# Patient Record
Sex: FEMALE | Race: WHITE | NOT HISPANIC OR LATINO | Employment: OTHER | URBAN - NONMETROPOLITAN AREA
[De-identification: names, ages, dates, MRNs, and addresses within clinical notes are randomized per-mention and may not be internally consistent; named-entity substitution may affect disease eponyms.]

---

## 2018-04-09 ENCOUNTER — OFFICE VISIT (OUTPATIENT)
Dept: OBSTETRICS AND GYNECOLOGY | Facility: CLINIC | Age: 68
End: 2018-04-09

## 2018-04-09 VITALS
HEIGHT: 55 IN | BODY MASS INDEX: 33.56 KG/M2 | SYSTOLIC BLOOD PRESSURE: 120 MMHG | WEIGHT: 145 LBS | DIASTOLIC BLOOD PRESSURE: 60 MMHG

## 2018-04-09 DIAGNOSIS — Z01.419 ENCOUNTER FOR GYNECOLOGICAL EXAMINATION WITHOUT ABNORMAL FINDING: Primary | ICD-10-CM

## 2018-04-09 DIAGNOSIS — Z12.31 ENCOUNTER FOR SCREENING MAMMOGRAM FOR BREAST CANCER: ICD-10-CM

## 2018-04-09 DIAGNOSIS — M85.9 DISORDER OF BONE DENSITY AND STRUCTURE, UNSPECIFIED: ICD-10-CM

## 2018-04-09 PROCEDURE — 99397 PER PM REEVAL EST PAT 65+ YR: CPT | Performed by: OBSTETRICS & GYNECOLOGY

## 2018-04-09 RX ORDER — BUTALBITAL, ACETAMINOPHEN, CAFFEINE AND CODEINE PHOSPHATE 50; 325; 40; 30 MG/1; MG/1; MG/1; MG/1
1 CAPSULE ORAL EVERY 4 HOURS PRN
COMMUNITY
End: 2018-08-08

## 2018-04-09 RX ORDER — OMEPRAZOLE 20 MG/1
20 CAPSULE, DELAYED RELEASE ORAL DAILY
COMMUNITY
End: 2022-04-27

## 2018-04-09 RX ORDER — GABAPENTIN 800 MG/1
800 TABLET ORAL 3 TIMES DAILY
COMMUNITY
End: 2018-08-08

## 2018-04-09 RX ORDER — MELOXICAM 15 MG/1
15 TABLET ORAL DAILY PRN
COMMUNITY
End: 2021-11-11

## 2018-04-09 RX ORDER — ATORVASTATIN CALCIUM 80 MG/1
80 TABLET, FILM COATED ORAL DAILY
COMMUNITY

## 2018-04-09 RX ORDER — TIZANIDINE 4 MG/1
4 TABLET ORAL NIGHTLY PRN
COMMUNITY

## 2018-04-09 NOTE — PROGRESS NOTES
Subjective  Chief Complaint   Patient presents with   • Gynecologic Exam     Patient is 67 y.o.  here for annual examination.  Pt see's Dr. Daniel for primary care.  Pt has been dx with fibromyalgia.  Pt with history of low bone density; has not had scan for several years.  Pt also with history of problems with IBS.  Pt reports due for colonoscopy; desires to have done in [place].  Pt did have MMG done last year in August; [facility] and reports normal.    History  Past Medical History:   Diagnosis Date   • Fibromyalgia    • GERD (gastroesophageal reflux disease)    • H/O bone density study    • H/O mammogram 2016   • Hyperlipemia    • IBS (irritable bowel syndrome)    • IBS (irritable bowel syndrome)      No current outpatient prescriptions on file prior to visit.     No current facility-administered medications on file prior to visit.      Allergies   Allergen Reactions   • Prednisone Other (See Comments)     HYPOGLYCEMIC EPISODE, GOES BLIND.      Past Surgical History:   Procedure Laterality Date   • BLADDER REPAIR     • COLONOSCOPY     • ELBOW ARTHROSCOPY     • GALLBLADDER SURGERY     • HYSTERECTOMY     • KNEE SURGERY     • TUBAL ABDOMINAL LIGATION       Family History   Problem Relation Age of Onset   • Ovarian cancer Mother    • Diabetes Mother    • Heart disease Mother    • Osteoporosis Sister    • Diabetes Brother    • Heart disease Brother    • Stroke Brother    • Colon polyps Brother      Social History     Social History   • Marital status:      Social History Main Topics   • Smoking status: Never Smoker   • Smokeless tobacco: Never Used   • Alcohol use No   • Drug use: No   • Sexual activity: Defer     Other Topics Concern   • Not on file     Review of Systems  All systems were reviewed and negative except for:  Constitution:  positive for trouble sleeping  ENT:  positive for nasal congestion  Musculoskeletal: positive for  joint pain and muscle  "weakness  Behavioral/Psych: positive for  unstable mood     Objective  Vitals:    04/09/18 1500   BP: 120/60   Weight: 65.8 kg (145 lb)   Height: 53 cm (20.87\")     Physical Exam:  General Appearance: alert, appears stated age and cooperative  Head: normocephalic, without obvious abnormality and atraumatic  Eyes: lids and lashes normal, conjunctivae and sclerae normal, no icterus, no pallor, corneas clear and PERRLA  Ears: ears appear intact with no abnormalities noted  Nose: nares normal, septum midline, mucosa normal and no drainage  Neck: suppple, trachea midline and no thyromegaly  Lungs: clear to auscultation, respirations regular, respirations even and respirations unlabored  Heart: regular rhythm and normal rate, normal S1, S2, no murmur, gallop, or rubs and no click  Breasts: Examined in supine position  Symmetric without masses or skin dimpling  Nipples normal without inversion, lesions or discharge  There are no palpable axillary nodes  Abdomen: normal bowel sounds, no masses, no hepatomegaly, no splenomegaly, soft non-tender, no guarding and no rebound tenderness  Pelvic: Clinical staff was present for exam  External genitalia:  normal appearance of the external genitalia including Bartholin's and [place]'s glands.  :  urethral meatus normal;  Vaginal:  atrophic mucosal changes are present;  Cervix:  absent.  Uterus:  absent.  Adnexa:  normal bimanual exam of the adnexa.  Extremities: moves extremities well, no edema, no cyanosis and no redness  Skin: no bleeding, bruising or rash and no lesions noted  Lymph Nodes: no palpable adenopathy  Neuro: CN II-X grossly intact; sensation intact  Psych: normal mood and affect, oriented to person, time and place, thought content organized and appropriate judgment    Lab Review   No data reviewed    Imaging   Mammogram report    Assessment/Plan  Problem List Items Addressed This Visit     None      Visit Diagnoses     Encounter for gynecological examination without " abnormal finding    -  Primary  Pap was done today.  If she does not receive the results of the Pap within 2 weeks  time, she was instructed to call to find out the results.  I explained to Klaudia that the recommendations for Pap smear interval in a low risk patient has lengthened to 3 years time if cytology alone normal or  5 years time if both cytology and HPV testing were normal.  I encouraged her to be seen yearly for a full physical exam including breast and pelvic exam even during the off years when PAP's will not be performed.    Bone density testing was recommended.  It is recommended that all women should begin DEXA screening at age 65 years and screening can be used selectively for women younger who have risk factors.   I reviewed with Klaudia that it was always most advisable for all bone density tests for each patient to be done on the same machine over time.  The purpose of this is to improve the accuracy of the interpretation of serial studies.    Relevant Orders    Pap IG, Rfx HPV ASCU    Ambulatory Referral to General Surgery (Completed)    Encounter for screening mammogram for breast cancer      It is recommended per ACOG, for women at average risk to start annual mammogram screening at the age of 40 until the age of 75 and an individualized decision be made for women after age 75.  She was encouraged to continue getting yearly mammograms.  She should report any palpable breast lump(s) or skin changes regardless of mammographic findings.  I explained to Klaudia that notification regarding her mammogram results will come from the center performing the study.  Our office will not be routinely calling with mammogram results.  It is her responsibility to make sure that the results from the mammogram are communicated to her by the breast center.  If she has any questions about the results, she is welcome to call our office anytime.  MMG due in August.    Relevant Orders    Mammo Screening Digital Tomosynthesis  Bilateral With CAD    Disorder of bone density and structure, unspecified       See plan above    Relevant Orders    DEXA Bone Density Axial        Follow up as discussed   This note was electronically signed.  Teressa Gomes M.D.

## 2018-04-13 DIAGNOSIS — Z01.419 ENCOUNTER FOR GYNECOLOGICAL EXAMINATION WITHOUT ABNORMAL FINDING: ICD-10-CM

## 2018-04-17 ENCOUNTER — APPOINTMENT (OUTPATIENT)
Dept: BONE DENSITY | Facility: HOSPITAL | Age: 68
End: 2018-04-17
Attending: OBSTETRICS & GYNECOLOGY

## 2018-04-17 DIAGNOSIS — M85.9 DISORDER OF BONE DENSITY AND STRUCTURE, UNSPECIFIED: ICD-10-CM

## 2018-04-17 PROCEDURE — 77080 DXA BONE DENSITY AXIAL: CPT

## 2018-07-16 ENCOUNTER — OFFICE VISIT (OUTPATIENT)
Dept: OBSTETRICS AND GYNECOLOGY | Facility: CLINIC | Age: 68
End: 2018-07-16

## 2018-07-16 VITALS
SYSTOLIC BLOOD PRESSURE: 110 MMHG | BODY MASS INDEX: 24.1 KG/M2 | DIASTOLIC BLOOD PRESSURE: 70 MMHG | HEIGHT: 63 IN | WEIGHT: 136 LBS

## 2018-07-16 DIAGNOSIS — N81.11 CYSTOCELE, MIDLINE: ICD-10-CM

## 2018-07-16 DIAGNOSIS — N81.6 RECTOCELE: Primary | ICD-10-CM

## 2018-07-16 PROCEDURE — 99213 OFFICE O/P EST LOW 20 MIN: CPT | Performed by: PHYSICIAN ASSISTANT

## 2018-07-16 NOTE — PROGRESS NOTES
Subjective   Chief Complaint   Patient presents with   • Vaginal Prolapse     woke up this morning with buldging from vagina; Bladder repair in        Klaudia Heredia is a 67 y.o. year old  presenting to be seen for complaint of vaginal buldge.  She noted buldge this morning for the first time but states has felt different in vaginal area for a few weeks.  She has had previous hysterectomy and BSO and bladder repair per Dr. Vasquez several years ago in .     She has urinary urgency but no dysuria or recent UTI. She feels like still has stool in rectum after having bowel movement.   Patient very active with tennis and pickle ball       Past Medical History:   Diagnosis Date   • Fibromyalgia    • GERD (gastroesophageal reflux disease)    • H/O bone density study    • H/O mammogram 2016   • Hyperlipemia    • IBS (irritable bowel syndrome)    • IBS (irritable bowel syndrome)         Current Outpatient Prescriptions:   •  meloxicam (MOBIC) 15 MG tablet, Take 15 mg by mouth Daily., Disp: , Rfl:   •  omeprazole (priLOSEC) 20 MG capsule, Take 20 mg by mouth Daily., Disp: , Rfl:   •  atorvastatin (LIPITOR) 80 MG tablet, Take 80 mg by mouth Daily., Disp: , Rfl:   •  butalbital-acetaminophen-caffeine-codeine (FIORICET/CODEINE) -23-30 MG per capsule, Take 1 capsule by mouth Every 4 (Four) Hours As Needed for Headache., Disp: , Rfl:   •  gabapentin (NEURONTIN) 800 MG tablet, Take 800 mg by mouth 3 (Three) Times a Day., Disp: , Rfl:   •  tiZANidine (ZANAFLEX) 4 MG tablet, Take 4 mg by mouth At Night As Needed for Muscle Spasms., Disp: , Rfl:    Allergies   Allergen Reactions   • Prednisone Other (See Comments)     HYPOGLYCEMIC EPISODE, GOES BLIND.       Past Surgical History:   Procedure Laterality Date   • BILATERAL OOPHORECTOMY     • BLADDER REPAIR     • COLONOSCOPY     • ELBOW ARTHROSCOPY     • GALLBLADDER SURGERY     • HYSTERECTOMY     • KNEE SURGERY     • TUBAL  "ABDOMINAL LIGATION        Social History     Social History   • Marital status:      Spouse name: N/A   • Number of children: N/A   • Years of education: N/A     Occupational History   • Not on file.     Social History Main Topics   • Smoking status: Never Smoker   • Smokeless tobacco: Never Used   • Alcohol use No   • Drug use: No   • Sexual activity: Yes     Partners: Male     Birth control/ protection: Surgical     Other Topics Concern   • Not on file     Social History Narrative   • No narrative on file      Family History   Problem Relation Age of Onset   • Ovarian cancer Mother    • Diabetes Mother    • Heart disease Mother    • Osteoporosis Sister    • Diabetes Brother    • Heart disease Brother    • Stroke Brother    • Colon polyps Brother        Review of Systems   Constitutional: Negative.    Gastrointestinal: Negative.    Genitourinary: Positive for urgency. Negative for difficulty urinating, dyspareunia, dysuria, pelvic pain, vaginal bleeding and vaginal discharge.           Objective   /70   Ht 160 cm (63\")   Wt 61.7 kg (136 lb)   Breastfeeding? No   BMI 24.09 kg/m²     Physical Exam   Constitutional: She appears well-developed and well-nourished. She is cooperative.   Abdominal: Soft. Normal appearance. There is no tenderness.   Genitourinary: There is no tenderness or lesion on the right labia. There is no tenderness or lesion on the left labia.   Genitourinary Comments: 2-3 plus rectocele with cough and 1 plus cystocele.  Vaginal cuff 1 plus prolapsed   Neurological: She is alert.   Skin: Skin is warm and dry.   Psychiatric: She has a normal mood and affect. Her behavior is normal.            Assessment and Plan  Klaudia was seen today for vaginal prolapse.    Diagnoses and all orders for this visit:    Rectocele    Cystocele, midline      Patient Instructions   Patient counseled regarding conservative options of kegels and limiting activities. However she states she does not want " buldge to remain in vaginal area and is desiring to discuss surgical repair so will have her return to be examined by Dr. Gomes and discuss rectocele repair, possible small anterior repair, possible enterocele repair.               This note was electronically signed.    Wandy Hunt PA-C   July 16, 2018

## 2018-07-16 NOTE — PATIENT INSTRUCTIONS
Patient counseled regarding conservative options of kegels and limiting activities. However she states she does not want buldge to remain in vaginal area and is desiring to discuss surgical repair so will have her return to be examined by Dr. Gomes and discuss rectocele repair, possible small anterior repair, possible enterocele repair.

## 2018-08-08 ENCOUNTER — APPOINTMENT (OUTPATIENT)
Dept: PREADMISSION TESTING | Facility: HOSPITAL | Age: 68
End: 2018-08-08

## 2018-08-08 ENCOUNTER — OFFICE VISIT (OUTPATIENT)
Dept: OBSTETRICS AND GYNECOLOGY | Facility: CLINIC | Age: 68
End: 2018-08-08

## 2018-08-08 ENCOUNTER — PREP FOR SURGERY (OUTPATIENT)
Dept: OTHER | Facility: HOSPITAL | Age: 68
End: 2018-08-08

## 2018-08-08 VITALS
WEIGHT: 137 LBS | SYSTOLIC BLOOD PRESSURE: 112 MMHG | BODY MASS INDEX: 24.27 KG/M2 | HEIGHT: 63 IN | DIASTOLIC BLOOD PRESSURE: 67 MMHG

## 2018-08-08 VITALS — WEIGHT: 135 LBS | BODY MASS INDEX: 23.92 KG/M2 | HEIGHT: 63 IN

## 2018-08-08 DIAGNOSIS — N81.11 CYSTOCELE, MIDLINE: ICD-10-CM

## 2018-08-08 DIAGNOSIS — N81.6 RECTOCELE: ICD-10-CM

## 2018-08-08 DIAGNOSIS — N81.11 MIDLINE CYSTOCELE: Primary | ICD-10-CM

## 2018-08-08 DIAGNOSIS — N95.2 POSTMENOPAUSAL ATROPHIC VAGINITIS: Primary | ICD-10-CM

## 2018-08-08 DIAGNOSIS — N81.11 MIDLINE CYSTOCELE: ICD-10-CM

## 2018-08-08 LAB
ABO GROUP BLD: NORMAL
ANION GAP SERPL CALCULATED.3IONS-SCNC: 13.2 MMOL/L (ref 10–20)
BILIRUB UR QL STRIP: NEGATIVE
BUN BLD-MCNC: 16 MG/DL (ref 7–20)
BUN/CREAT SERPL: 22.9 (ref 7.1–23.5)
CALCIUM SPEC-SCNC: 10.2 MG/DL (ref 8.4–10.2)
CHLORIDE SERPL-SCNC: 102 MMOL/L (ref 98–107)
CLARITY UR: CLEAR
CO2 SERPL-SCNC: 29 MMOL/L (ref 26–30)
COLOR UR: YELLOW
CREAT BLD-MCNC: 0.7 MG/DL (ref 0.6–1.3)
DEPRECATED RDW RBC AUTO: 39.9 FL (ref 37–54)
ERYTHROCYTE [DISTWIDTH] IN BLOOD BY AUTOMATED COUNT: 11.9 % (ref 11.5–14.5)
GFR SERPL CREATININE-BSD FRML MDRD: 84 ML/MIN/1.73
GLUCOSE BLD-MCNC: 96 MG/DL (ref 74–98)
GLUCOSE UR STRIP-MCNC: NEGATIVE MG/DL
HCT VFR BLD AUTO: 43.1 % (ref 37–47)
HGB BLD-MCNC: 14.8 G/DL (ref 12–16)
HGB UR QL STRIP.AUTO: NEGATIVE
KETONES UR QL STRIP: NEGATIVE
LEUKOCYTE ESTERASE UR QL STRIP.AUTO: NEGATIVE
MCH RBC QN AUTO: 31.6 PG (ref 27–31)
MCHC RBC AUTO-ENTMCNC: 34.3 G/DL (ref 30–37)
MCV RBC AUTO: 91.9 FL (ref 81–99)
NITRITE UR QL STRIP: NEGATIVE
PH UR STRIP.AUTO: 6.5 [PH] (ref 5–8)
PLATELET # BLD AUTO: 304 10*3/MM3 (ref 130–400)
PMV BLD AUTO: 10.6 FL (ref 6–12)
POTASSIUM BLD-SCNC: 4.2 MMOL/L (ref 3.5–5.1)
PROT UR QL STRIP: NEGATIVE
RBC # BLD AUTO: 4.69 10*6/MM3 (ref 4.2–5.4)
RH BLD: POSITIVE
SODIUM BLD-SCNC: 140 MMOL/L (ref 137–145)
SP GR UR STRIP: 1.02 (ref 1–1.03)
UROBILINOGEN UR QL STRIP: NORMAL
WBC NRBC COR # BLD: 10.65 10*3/MM3 (ref 4.8–10.8)

## 2018-08-08 PROCEDURE — 81003 URINALYSIS AUTO W/O SCOPE: CPT | Performed by: OBSTETRICS & GYNECOLOGY

## 2018-08-08 PROCEDURE — 86900 BLOOD TYPING SEROLOGIC ABO: CPT | Performed by: OBSTETRICS & GYNECOLOGY

## 2018-08-08 PROCEDURE — 86901 BLOOD TYPING SEROLOGIC RH(D): CPT | Performed by: OBSTETRICS & GYNECOLOGY

## 2018-08-08 PROCEDURE — 80048 BASIC METABOLIC PNL TOTAL CA: CPT | Performed by: OBSTETRICS & GYNECOLOGY

## 2018-08-08 PROCEDURE — 36415 COLL VENOUS BLD VENIPUNCTURE: CPT

## 2018-08-08 PROCEDURE — 99214 OFFICE O/P EST MOD 30 MIN: CPT | Performed by: OBSTETRICS & GYNECOLOGY

## 2018-08-08 PROCEDURE — 85027 COMPLETE CBC AUTOMATED: CPT | Performed by: OBSTETRICS & GYNECOLOGY

## 2018-08-08 RX ORDER — ESTRADIOL 0.1 MG/G
CREAM VAGINAL
Qty: 1 EACH | Refills: 11 | Status: SHIPPED | OUTPATIENT
Start: 2018-08-08 | End: 2018-08-30 | Stop reason: HOSPADM

## 2018-08-08 RX ORDER — PHENAZOPYRIDINE HYDROCHLORIDE 100 MG/1
200 TABLET, FILM COATED ORAL ONCE
Status: CANCELLED | OUTPATIENT
Start: 2018-08-08 | End: 2018-08-08

## 2018-08-08 RX ORDER — MELATONIN
1000 DAILY
COMMUNITY

## 2018-08-08 RX ORDER — LANOLIN ALCOHOL/MO/W.PET/CERES
1000 CREAM (GRAM) TOPICAL DAILY
COMMUNITY

## 2018-08-08 RX ORDER — SODIUM CHLORIDE 0.9 % (FLUSH) 0.9 %
1-10 SYRINGE (ML) INJECTION AS NEEDED
Status: CANCELLED | OUTPATIENT
Start: 2018-08-08

## 2018-08-08 NOTE — PROGRESS NOTES
Subjective  Chief Complaint   Patient presents with   • Follow-up     DISCUSS CYSTOCELE, RECOCELE REPAIR.      Patient is 67 y.o.  here for evaluation and discussion of treatment for rectocele.  Pt had annual examination done earlier this year.  Pt reports no problems at that time.  Pt did have colonoscopy done earlier this year.  Pt with complaints of an acute onset of vaginal pressure and bulge per vagina.  Pt reports stools are soft and patient does not strain with bms.  Pt does report however she does not feel like she is getting all the stool out now.  Pt denies any problems with urination.  Pt has not changed any activities.  Pt is very active; plays tennis and various sports.  Pt does not want to try pessary and desires surgical intervention.  Pt is not on any topical estrogen.  Pt has not been sick recently or had any changes otherwise.    History  Past Medical History:   Diagnosis Date   • Arthritis     REPORTS BOTHERSOME IN HER NECK   • Body piercing     EARS   • Cancer (CMS/HCC)     SKIN (REMOVED FROM NOSE)   • Elevated cholesterol    • Fibromyalgia    • GERD (gastroesophageal reflux disease)    • H/O bone density study    • H/O exercise stress test     REPORTS APPROXIMATELY  AND THAT ALL WAS WNL'S   • H/O mammogram 2016   • Headache    • Hearing loss     REPORTS MINIMAL AND ONLY WITH CERTAIN FREQUENCIES. NO HEARING AIDS   • History of bronchitis    • History of TMJ syndrome    • Hyperlipemia    • IBS (irritable bowel syndrome)    • IBS (irritable bowel syndrome)    • Rectocele    • Seasonal allergies    • TB (tuberculosis)     REPORTS +TB SKIN TEST IN THE S.  REPORTS SHE HAD TREATMENT FOR 1 YEAR BUT NO ACTIVE DISEASE.    • Wears glasses     NON RX FOR READING     Current Outpatient Prescriptions on File Prior to Visit   Medication Sig Dispense Refill   • atorvastatin (LIPITOR) 80 MG tablet Take 80 mg by mouth Daily.     • meloxicam (MOBIC) 15 MG tablet Take 15 mg by mouth Daily As  Needed for Mild Pain .     • omeprazole (priLOSEC) 20 MG capsule Take 20 mg by mouth Daily.     • tiZANidine (ZANAFLEX) 4 MG tablet Take 4 mg by mouth At Night As Needed for Muscle Spasms.     • [DISCONTINUED] butalbital-acetaminophen-caffeine-codeine (FIORICET/CODEINE) -99-30 MG per capsule Take 1 capsule by mouth Every 4 (Four) Hours As Needed for Headache.     • [DISCONTINUED] gabapentin (NEURONTIN) 800 MG tablet Take 800 mg by mouth 3 (Three) Times a Day.       No current facility-administered medications on file prior to visit.      Allergies   Allergen Reactions   • Prednisone Other (See Comments)     REPORTS CAUSED VISUAL DISTURBANCE (ACUTE BLINDNESS) AND HYPOGLYCEMIA.  REPORTS SHE IS ABLE TO TAKE STEROID INJECTIONS, BUT THAT SHE IS NOT ABLE TO TOLERATE ORAL DOSES FOR PROLONGED PERIODS OF TIME.     Past Surgical History:   Procedure Laterality Date   • BILATERAL OOPHORECTOMY     • BLADDER REPAIR  2001   • COLONOSCOPY  2011   • CYST REMOVAL      POSTERIOR UPPER LEFT THIGH AND LEFT SIDE OF NECK   • ELBOW ARTHROSCOPY Right 1996   • ENDOSCOPY     • EYE SURGERY Bilateral     CATARACT EXTRACTIONS   • GALLBLADDER SURGERY  2006   • HYSTERECTOMY  2002   • KNEE SURGERY      REPORTS 3 SURGERIES LEFT KNEE, 1 SURGERY RIGHT KNEE   • SKIN BIOPSY      REPORTS SKIN CANCER REMOVED FROM NOSE   • TUBAL ABDOMINAL LIGATION     • WISDOM TOOTH EXTRACTION      EXTRACTIONS ON THE LEFT SIDE (TOP AND BOTTOM)     Family History   Problem Relation Age of Onset   • Ovarian cancer Mother    • Diabetes Mother    • Heart disease Mother    • Osteoporosis Sister    • Diabetes Brother    • Heart disease Brother    • Stroke Brother    • Colon polyps Brother      Social History     Social History   • Marital status:      Social History Main Topics   • Smoking status: Never Smoker   • Smokeless tobacco: Never Used   • Alcohol use No   • Drug use: No   • Sexual activity: Yes     Partners: Male     Birth control/ protection: Surgical  "    Other Topics Concern   • Not on file     Review of Systems  All systems were reviewed and negative except for:  Gastrointestinal: postitive for  constipation  Genitourinary: postivie for  difficulty/inability to void     Objective  Vitals:    08/08/18 1457   BP: 112/67   Weight: 62.1 kg (137 lb)   Height: 160 cm (63\")     Physical Exam:  General Appearance: alert, appears stated age and cooperative  Head: normocephalic, without obvious abnormality and atraumatic  Eyes: lids and lashes normal, conjunctivae and sclerae normal, no icterus, no pallor, corneas clear and PERRLA  Ears: ears appear intact with no abnormalities noted  Nose: nares normal, septum midline, mucosa normal and no drainage  Neck: suppple, trachea midline and no thyromegaly  Lungs: clear to auscultation, respirations regular, respirations even and respirations unlabored  Heart: regular rhythm and normal rate, normal S1, S2, no murmur, gallop, or rubs and no click  Breasts: Not performed.  Abdomen: normal bowel sounds, no masses, no hepatomegaly, no splenomegaly, soft non-tender, no guarding and no rebound tenderness  Pelvic: Clinical staff was present for exam  External genitalia:  normal appearance of the external genitalia including Bartholin's and [place]'s glands.  :  urethral meatus normal;  Vaginal:  atrophic mucosal changes are present;  Cervix:  absent.  Uterus:  absent.  Adnexa:  normal bimanual exam of the adnexa.  Cystocele GRADE 1  Rectocele GRADE 3  Extremities: moves extremities well, no edema, no cyanosis and no redness  Skin: no bleeding, bruising or rash and no lesions noted  Lymph Nodes: no palpable adenopathy  Neuro: CN II-X grossly intact; sensation intact  Psych: normal mood and affect, oriented to person, time and place, thought content organized and appropriate judgment  Lab Review   No data reviewed    Imaging   No data reviewed    Assessment/Plan  Problem List Items Addressed This Visit        Digestive    Rectocele  See " plan below      Other Visit Diagnoses     Postmenopausal atrophic vaginitis    -  Primary  Discussed various options for relief of atrophic vaginal symptoms related to menopause. Discussed local therapy for treatment of vaginal symptoms only.  Discussed the different formulation options including cream, ring, and tablets.  Discussed the low risk of systemic absorption in postmenopausal women with atrophy using 25 mcgs of estradiol on a daily basis.  Recommend low dose use 2-3x/wk for maintenance of treatment.  Other treatment options were discussed including the use of water-based and silicone-based vaginal lubricants and moisturizers.  Also discussed was the FDA approved treatment option of ospemifene for moderate to severe dyspareunia.    Cystocele, midline      Pt with pelvic organ prolapse.  Risks factors discussed including increasing parity, advancing age, obesity, history of hysterectomy, and chronic constipation.  Signs and symptoms discussed.  Treatment options discussed as well including expectant management, pelvic floor muscle exercises, pessary, medication, and surgery.  Changes in lifestyle discussed including no heavy lifting or straining, keep stools soft, and avoid increasing pressure in the area of prolapse.  Pelvic floor exercises were also discussed and reviewed.  Benefits and complications of wearing a pessary was discussed.  Estrogen therapy to strengthen the supporting structures and possibly ameliorate symptoms was discussed.  Surgical intervention with risks, complications, and benefits was also discussed.  Pt desires surgical intervention.  Pt to schedule A&P repair.  Discussed surgical risks, benefits, complications, and other alternatives.  Also discussed recovery as well.  Pt desires to schedule as noted.    Relevant Medications    estradiol (ESTRACE VAGINAL) 0.1 MG/GM vaginal cream      Total time spent today with Klaudia  was 30 minutes (level 4).  Greater than 50% of the time was spent  face to face coordinating and planning care, answering her questions and counseling regarding pathophysiology of her presenting problem along with plans for any diagnositc work-up and treatment.  Follow up as scheduled   This note was electronically signed.  Teressa Gomes M.D.

## 2018-08-08 NOTE — DISCHARGE INSTRUCTIONS
PAT PASS GIVEN/REVIEWED WITH PT.  VERBALIZED UNDERSTANDING OF THE FOLLOWING:  DO NOT EAT, DRINK, SMOKE, USE SMOKELESS TOBACCO OR CHEW GUM AFTER MIDNIGHT THE NIGHT BEFORE SURGERY.  THIS ALSO INCLUDES HARD CANDIES AND MINTS.    DO NOT SHAVE THE AREA TO BE OPERATED ON AT LEAST 48 HOURS PRIOR TO THE PROCEDURE.  DO NOT WEAR MAKE UP OR NAIL POLISH.  DO NOT LEAVE IN ANY PIERCING OR WEAR JEWELRY THE DAY OF SURGERY.      DO NOT USE ADHESIVES IF YOU WEAR DENTURES.    DO NOT WEAR EYE CONTACTS; BRING IN YOUR GLASSES.    ONLY TAKE MEDICATION THE MORNING OF YOUR PROCEDURE IF INSTRUCTED BY YOUR SURGEON WITH ENOUGH WATER TO SWALLOW THE MEDICATION.  IF YOUR SURGEON DID NOT SPECIFY WHICH MEDICATIONS TO TAKE, YOU WILL NEED TO CALL THEIR OFFICE FOR FURTHER INSTRUCTIONS AND DO AS THEY INSTRUCT.    LEAVE ANYTHING YOU CONSIDER VALUABLE AT HOME.    YOU WILL NEED TO ARRANGE FOR SOMEONE TO DRIVE YOU HOME AFTER SURGERY.  IT IS RECOMMENDED THAT YOU DO NOT DRIVE, WORK, DRINK ALCOHOL OR MAKE MAJOR DECISIONS FOR AT LEAST 24 HOURS AFTER YOUR PROCEDURE IS COMPLETE.      THE DAY OF YOUR PROCEDURE, BRING IN THE FOLLOWING IF APPLICABLE:   PICTURE ID AND INSURANCE/MEDICARE OR MEDICAID CARDS   COPY OF ADVANCED DIRECTIVE/LIVING WILL/POWER OR    CPAP/BIPAP/INHALERS   SKIN PREP SHEET   YOUR PREADMISSION TESTING PASS (IF NOT A PHONE HISTORY)        GYNECOLOGICAL PREP:    1). Soap Suds Enema (as instructed) the night before surgery.    2). Purchase two medicated douches. Use one douche the night before the procedure     and the other on the morning of your surgery.    Schedule:    On______, the evening before the procedure, do the following:    Soap Suds Enema  1 Medicated Douche  Shower  Use 1 package of chlorhexidine wipes, 1 hour after the shower.      On______, the morning of the procedure, do the followin Medicated Douche  Use 1 package of chlorhexidine wipes.Chlorhexidine wipes given to patient with verification sheet. Patient/Family  member verbalized understanding to all teaching and instruction.

## 2018-08-27 PROCEDURE — S0260 H&P FOR SURGERY: HCPCS | Performed by: OBSTETRICS & GYNECOLOGY

## 2018-08-28 ENCOUNTER — HOSPITAL ENCOUNTER (OUTPATIENT)
Facility: HOSPITAL | Age: 68
Discharge: HOME | End: 2018-08-30
Attending: OBSTETRICS & GYNECOLOGY | Admitting: OBSTETRICS & GYNECOLOGY

## 2018-08-28 ENCOUNTER — ANESTHESIA (OUTPATIENT)
Dept: PERIOP | Facility: HOSPITAL | Age: 68
End: 2018-08-28

## 2018-08-28 ENCOUNTER — ANESTHESIA EVENT (OUTPATIENT)
Dept: PERIOP | Facility: HOSPITAL | Age: 68
End: 2018-08-28

## 2018-08-28 DIAGNOSIS — N81.11 MIDLINE CYSTOCELE: ICD-10-CM

## 2018-08-28 LAB
ABO GROUP BLD: NORMAL
BLD GP AB SCN SERPL QL: NEGATIVE
RH BLD: POSITIVE
T&S EXPIRATION DATE: NORMAL

## 2018-08-28 PROCEDURE — 57265 CMBN AP COLPRHY W/NTRCL RPR: CPT | Performed by: OBSTETRICS & GYNECOLOGY

## 2018-08-28 PROCEDURE — 25010000002 ONDANSETRON PER 1 MG: Performed by: NURSE ANESTHETIST, CERTIFIED REGISTERED

## 2018-08-28 PROCEDURE — 86850 RBC ANTIBODY SCREEN: CPT | Performed by: OBSTETRICS & GYNECOLOGY

## 2018-08-28 PROCEDURE — 86900 BLOOD TYPING SEROLOGIC ABO: CPT | Performed by: OBSTETRICS & GYNECOLOGY

## 2018-08-28 PROCEDURE — 25010000002 MORPHINE PER 10 MG: Performed by: OBSTETRICS & GYNECOLOGY

## 2018-08-28 PROCEDURE — 25010000002 PROMETHAZINE PER 50 MG

## 2018-08-28 PROCEDURE — 25010000002 MORPHINE PER 10 MG

## 2018-08-28 PROCEDURE — 25010000002 HYDROMORPHONE PER 4 MG: Performed by: NURSE ANESTHETIST, CERTIFIED REGISTERED

## 2018-08-28 PROCEDURE — 25010000003 CEFAZOLIN PER 500 MG: Performed by: OBSTETRICS & GYNECOLOGY

## 2018-08-28 PROCEDURE — G0378 HOSPITAL OBSERVATION PER HR: HCPCS

## 2018-08-28 PROCEDURE — 63710000001 PHENAZOPYRIDINE 100 MG TABLET: Performed by: OBSTETRICS & GYNECOLOGY

## 2018-08-28 PROCEDURE — 86901 BLOOD TYPING SEROLOGIC RH(D): CPT | Performed by: OBSTETRICS & GYNECOLOGY

## 2018-08-28 PROCEDURE — 25010000002 KETOROLAC TROMETHAMINE PER 15 MG: Performed by: NURSE ANESTHETIST, CERTIFIED REGISTERED

## 2018-08-28 PROCEDURE — 88302 TISSUE EXAM BY PATHOLOGIST: CPT | Performed by: OBSTETRICS & GYNECOLOGY

## 2018-08-28 PROCEDURE — A9270 NON-COVERED ITEM OR SERVICE: HCPCS | Performed by: OBSTETRICS & GYNECOLOGY

## 2018-08-28 PROCEDURE — 25010000002 DEXAMETHASONE PER 1 MG: Performed by: NURSE ANESTHETIST, CERTIFIED REGISTERED

## 2018-08-28 PROCEDURE — 25010000002 PROPOFOL 200 MG/20ML EMULSION: Performed by: NURSE ANESTHETIST, CERTIFIED REGISTERED

## 2018-08-28 RX ORDER — MORPHINE SULFATE 1 MG/ML
INJECTION INTRAVENOUS CONTINUOUS
Status: DISPENSED | OUTPATIENT
Start: 2018-08-28 | End: 2018-08-29

## 2018-08-28 RX ORDER — PHENAZOPYRIDINE HYDROCHLORIDE 100 MG/1
200 TABLET, FILM COATED ORAL ONCE
Status: COMPLETED | OUTPATIENT
Start: 2018-08-28 | End: 2018-08-28

## 2018-08-28 RX ORDER — ONDANSETRON 4 MG/1
4 TABLET, ORALLY DISINTEGRATING ORAL EVERY 6 HOURS PRN
Status: DISCONTINUED | OUTPATIENT
Start: 2018-08-28 | End: 2018-08-30 | Stop reason: HOSPADM

## 2018-08-28 RX ORDER — BACLOFEN 10 MG/1
10 TABLET ORAL 3 TIMES DAILY
COMMUNITY
End: 2021-11-11

## 2018-08-28 RX ORDER — ONDANSETRON 4 MG/1
4 TABLET, FILM COATED ORAL EVERY 6 HOURS PRN
Status: DISCONTINUED | OUTPATIENT
Start: 2018-08-28 | End: 2018-08-30 | Stop reason: HOSPADM

## 2018-08-28 RX ORDER — NALOXONE HCL 0.4 MG/ML
0.4 VIAL (ML) INJECTION
Status: DISCONTINUED | OUTPATIENT
Start: 2018-08-28 | End: 2018-08-30 | Stop reason: HOSPADM

## 2018-08-28 RX ORDER — MORPHINE SULFATE/0.9% NACL/PF 1 MG/ML
SYRINGE (ML) INJECTION
Status: COMPLETED
Start: 2018-08-28 | End: 2018-08-28

## 2018-08-28 RX ORDER — GLYCOPYRROLATE 0.2 MG/ML
INJECTION INTRAMUSCULAR; INTRAVENOUS AS NEEDED
Status: DISCONTINUED | OUTPATIENT
Start: 2018-08-28 | End: 2018-08-28 | Stop reason: SURG

## 2018-08-28 RX ORDER — MORPHINE SULFATE 2 MG/ML
1 INJECTION, SOLUTION INTRAMUSCULAR; INTRAVENOUS EVERY 4 HOURS PRN
Status: DISCONTINUED | OUTPATIENT
Start: 2018-08-28 | End: 2018-08-30 | Stop reason: HOSPADM

## 2018-08-28 RX ORDER — ROCURONIUM BROMIDE 10 MG/ML
INJECTION, SOLUTION INTRAVENOUS AS NEEDED
Status: DISCONTINUED | OUTPATIENT
Start: 2018-08-28 | End: 2018-08-28 | Stop reason: SURG

## 2018-08-28 RX ORDER — NEOSTIGMINE METHYLSULFATE 5 MG/5 ML
SYRINGE (ML) INTRAVENOUS AS NEEDED
Status: DISCONTINUED | OUTPATIENT
Start: 2018-08-28 | End: 2018-08-28 | Stop reason: SURG

## 2018-08-28 RX ORDER — PROMETHAZINE HYDROCHLORIDE 25 MG/ML
INJECTION, SOLUTION INTRAMUSCULAR; INTRAVENOUS
Status: COMPLETED
Start: 2018-08-28 | End: 2018-08-28

## 2018-08-28 RX ORDER — ACETAMINOPHEN 500 MG
1000 TABLET ORAL ONCE
Status: DISCONTINUED | OUTPATIENT
Start: 2018-08-28 | End: 2018-08-28

## 2018-08-28 RX ORDER — BISACODYL 10 MG
10 SUPPOSITORY, RECTAL RECTAL DAILY PRN
Status: DISCONTINUED | OUTPATIENT
Start: 2018-08-28 | End: 2018-08-30 | Stop reason: HOSPADM

## 2018-08-28 RX ORDER — IBUPROFEN 600 MG/1
600 TABLET ORAL EVERY 6 HOURS PRN
Status: DISCONTINUED | OUTPATIENT
Start: 2018-08-29 | End: 2018-08-30 | Stop reason: HOSPADM

## 2018-08-28 RX ORDER — DIPHENHYDRAMINE HYDROCHLORIDE 50 MG/ML
25 INJECTION INTRAMUSCULAR; INTRAVENOUS EVERY 6 HOURS PRN
Status: DISCONTINUED | OUTPATIENT
Start: 2018-08-28 | End: 2018-08-30 | Stop reason: HOSPADM

## 2018-08-28 RX ORDER — DEXAMETHASONE SODIUM PHOSPHATE 4 MG/ML
INJECTION, SOLUTION INTRA-ARTICULAR; INTRALESIONAL; INTRAMUSCULAR; INTRAVENOUS; SOFT TISSUE AS NEEDED
Status: DISCONTINUED | OUTPATIENT
Start: 2018-08-28 | End: 2018-08-28 | Stop reason: SURG

## 2018-08-28 RX ORDER — DOCUSATE SODIUM 100 MG/1
100 CAPSULE, LIQUID FILLED ORAL 2 TIMES DAILY PRN
Status: DISCONTINUED | OUTPATIENT
Start: 2018-08-28 | End: 2018-08-30 | Stop reason: HOSPADM

## 2018-08-28 RX ORDER — PROMETHAZINE HYDROCHLORIDE 25 MG/ML
12.5 INJECTION, SOLUTION INTRAMUSCULAR; INTRAVENOUS EVERY 4 HOURS PRN
Status: DISCONTINUED | OUTPATIENT
Start: 2018-08-28 | End: 2018-08-30 | Stop reason: HOSPADM

## 2018-08-28 RX ORDER — DEXTROSE AND SODIUM CHLORIDE 5; .45 G/100ML; G/100ML
125 INJECTION, SOLUTION INTRAVENOUS CONTINUOUS
Status: DISCONTINUED | OUTPATIENT
Start: 2018-08-28 | End: 2018-08-30 | Stop reason: HOSPADM

## 2018-08-28 RX ORDER — SIMETHICONE 80 MG
80 TABLET,CHEWABLE ORAL 4 TIMES DAILY PRN
Status: DISCONTINUED | OUTPATIENT
Start: 2018-08-28 | End: 2018-08-30 | Stop reason: HOSPADM

## 2018-08-28 RX ORDER — NALOXONE HCL 0.4 MG/ML
0.1 VIAL (ML) INJECTION
Status: DISCONTINUED | OUTPATIENT
Start: 2018-08-28 | End: 2018-08-30 | Stop reason: HOSPADM

## 2018-08-28 RX ORDER — SODIUM CHLORIDE 0.9 % (FLUSH) 0.9 %
1-10 SYRINGE (ML) INJECTION AS NEEDED
Status: DISCONTINUED | OUTPATIENT
Start: 2018-08-28 | End: 2018-08-28 | Stop reason: HOSPADM

## 2018-08-28 RX ORDER — HYDROMORPHONE HCL 110MG/55ML
PATIENT CONTROLLED ANALGESIA SYRINGE INTRAVENOUS AS NEEDED
Status: DISCONTINUED | OUTPATIENT
Start: 2018-08-28 | End: 2018-08-28 | Stop reason: SURG

## 2018-08-28 RX ORDER — ALUMINA, MAGNESIA, AND SIMETHICONE 2400; 2400; 240 MG/30ML; MG/30ML; MG/30ML
15 SUSPENSION ORAL EVERY 6 HOURS PRN
Status: DISCONTINUED | OUTPATIENT
Start: 2018-08-28 | End: 2018-08-30 | Stop reason: HOSPADM

## 2018-08-28 RX ORDER — SODIUM CHLORIDE, SODIUM LACTATE, POTASSIUM CHLORIDE, CALCIUM CHLORIDE 600; 310; 30; 20 MG/100ML; MG/100ML; MG/100ML; MG/100ML
1000 INJECTION, SOLUTION INTRAVENOUS CONTINUOUS
Status: DISCONTINUED | OUTPATIENT
Start: 2018-08-28 | End: 2018-08-28 | Stop reason: HOSPADM

## 2018-08-28 RX ORDER — KETOROLAC TROMETHAMINE 30 MG/ML
INJECTION, SOLUTION INTRAMUSCULAR; INTRAVENOUS AS NEEDED
Status: DISCONTINUED | OUTPATIENT
Start: 2018-08-28 | End: 2018-08-28 | Stop reason: SURG

## 2018-08-28 RX ORDER — KETAMINE HYDROCHLORIDE 50 MG/ML
INJECTION, SOLUTION, CONCENTRATE INTRAMUSCULAR; INTRAVENOUS AS NEEDED
Status: DISCONTINUED | OUTPATIENT
Start: 2018-08-28 | End: 2018-08-28 | Stop reason: SURG

## 2018-08-28 RX ORDER — PANTOPRAZOLE SODIUM 40 MG/1
40 TABLET, DELAYED RELEASE ORAL DAILY PRN
Status: DISCONTINUED | OUTPATIENT
Start: 2018-08-28 | End: 2018-08-30 | Stop reason: HOSPADM

## 2018-08-28 RX ORDER — PANTOPRAZOLE SODIUM 40 MG/10ML
40 INJECTION, POWDER, LYOPHILIZED, FOR SOLUTION INTRAVENOUS ONCE
Status: COMPLETED | OUTPATIENT
Start: 2018-08-29 | End: 2018-08-28

## 2018-08-28 RX ORDER — OXYCODONE HYDROCHLORIDE AND ACETAMINOPHEN 5; 325 MG/1; MG/1
2 TABLET ORAL EVERY 4 HOURS PRN
Status: DISCONTINUED | OUTPATIENT
Start: 2018-08-28 | End: 2018-08-30 | Stop reason: HOSPADM

## 2018-08-28 RX ORDER — ONDANSETRON 2 MG/ML
INJECTION INTRAMUSCULAR; INTRAVENOUS AS NEEDED
Status: DISCONTINUED | OUTPATIENT
Start: 2018-08-28 | End: 2018-08-28 | Stop reason: SURG

## 2018-08-28 RX ORDER — ONDANSETRON 2 MG/ML
4 INJECTION INTRAMUSCULAR; INTRAVENOUS EVERY 6 HOURS PRN
Status: DISCONTINUED | OUTPATIENT
Start: 2018-08-28 | End: 2018-08-30 | Stop reason: HOSPADM

## 2018-08-28 RX ORDER — MORPHINE SULFATE 2 MG/ML
2 INJECTION, SOLUTION INTRAMUSCULAR; INTRAVENOUS EVERY 4 HOURS PRN
Status: DISCONTINUED | OUTPATIENT
Start: 2018-08-28 | End: 2018-08-30 | Stop reason: HOSPADM

## 2018-08-28 RX ORDER — PROPOFOL 10 MG/ML
INJECTION, EMULSION INTRAVENOUS AS NEEDED
Status: DISCONTINUED | OUTPATIENT
Start: 2018-08-28 | End: 2018-08-28 | Stop reason: SURG

## 2018-08-28 RX ADMIN — LIDOCAINE HYDROCHLORIDE 40 MG: 20 INJECTION, SOLUTION INTRAVENOUS at 11:20

## 2018-08-28 RX ADMIN — KETOROLAC TROMETHAMINE 15 MG: 30 INJECTION, SOLUTION INTRAMUSCULAR at 12:30

## 2018-08-28 RX ADMIN — KETAMINE HYDROCHLORIDE 25 MG: 50 INJECTION, SOLUTION INTRAMUSCULAR; INTRAVENOUS at 11:27

## 2018-08-28 RX ADMIN — ROCURONIUM BROMIDE 30 MG: 10 INJECTION INTRAVENOUS at 11:24

## 2018-08-28 RX ADMIN — LIDOCAINE HYDROCHLORIDE 60 MG: 20 INJECTION, SOLUTION INTRAVENOUS at 11:32

## 2018-08-28 RX ADMIN — DEXAMETHASONE SODIUM PHOSPHATE 4 MG: 4 INJECTION, SOLUTION INTRAMUSCULAR; INTRAVENOUS at 11:20

## 2018-08-28 RX ADMIN — MORPHINE SULFATE: 1 INJECTION INTRAVENOUS at 13:15

## 2018-08-28 RX ADMIN — GLYCOPYRROLATE 0.2 MG: 0.2 INJECTION, SOLUTION INTRAMUSCULAR; INTRAVENOUS at 12:41

## 2018-08-28 RX ADMIN — HYDROMORPHONE HYDROCHLORIDE 0.25 MG: 2 INJECTION, SOLUTION INTRAMUSCULAR; INTRAVENOUS; SUBCUTANEOUS at 11:46

## 2018-08-28 RX ADMIN — SODIUM CHLORIDE, POTASSIUM CHLORIDE, SODIUM LACTATE AND CALCIUM CHLORIDE 1000 ML: 600; 310; 30; 20 INJECTION, SOLUTION INTRAVENOUS at 10:00

## 2018-08-28 RX ADMIN — MORPHINE SULFATE: 1 INJECTION INTRAVENOUS at 20:58

## 2018-08-28 RX ADMIN — KETOROLAC TROMETHAMINE 15 MG: 30 INJECTION, SOLUTION INTRAMUSCULAR at 11:34

## 2018-08-28 RX ADMIN — Medication 2 MG: at 12:28

## 2018-08-28 RX ADMIN — GLYCOPYRROLATE 0.2 MG: 0.2 INJECTION, SOLUTION INTRAMUSCULAR; INTRAVENOUS at 12:28

## 2018-08-28 RX ADMIN — PROMETHAZINE HYDROCHLORIDE 12.5 MG: 25 INJECTION INTRAMUSCULAR; INTRAVENOUS at 13:45

## 2018-08-28 RX ADMIN — PROMETHAZINE HYDROCHLORIDE 12.5 MG: 25 INJECTION, SOLUTION INTRAMUSCULAR; INTRAVENOUS at 13:45

## 2018-08-28 RX ADMIN — PHENAZOPYRIDINE 200 MG: 100 TABLET ORAL at 09:46

## 2018-08-28 RX ADMIN — CEFAZOLIN 2 G: 1 INJECTION, POWDER, FOR SOLUTION INTRAVENOUS at 11:25

## 2018-08-28 RX ADMIN — ONDANSETRON 4 MG: 2 INJECTION INTRAMUSCULAR; INTRAVENOUS at 11:20

## 2018-08-28 RX ADMIN — DEXTROSE AND SODIUM CHLORIDE 125 ML/HR: 5; 450 INJECTION, SOLUTION INTRAVENOUS at 13:15

## 2018-08-28 RX ADMIN — KETAMINE HYDROCHLORIDE 25 MG: 50 INJECTION, SOLUTION INTRAMUSCULAR; INTRAVENOUS at 11:46

## 2018-08-28 RX ADMIN — PROPOFOL 150 MG: 10 INJECTION, EMULSION INTRAVENOUS at 11:24

## 2018-08-28 RX ADMIN — HYDROMORPHONE HYDROCHLORIDE 0.5 MG: 2 INJECTION, SOLUTION INTRAMUSCULAR; INTRAVENOUS; SUBCUTANEOUS at 11:20

## 2018-08-28 RX ADMIN — DEXTROSE AND SODIUM CHLORIDE 125 ML/HR: 5; 450 INJECTION, SOLUTION INTRAVENOUS at 20:53

## 2018-08-28 RX ADMIN — SODIUM CHLORIDE, POTASSIUM CHLORIDE, SODIUM LACTATE AND CALCIUM CHLORIDE: 600; 310; 30; 20 INJECTION, SOLUTION INTRAVENOUS at 12:38

## 2018-08-28 RX ADMIN — PANTOPRAZOLE SODIUM 40 MG: 40 INJECTION, POWDER, FOR SOLUTION INTRAVENOUS at 23:51

## 2018-08-28 NOTE — ANESTHESIA POSTPROCEDURE EVALUATION
Patient: Klaudia Heredia    Procedure Summary     Date:  08/28/18 Room / Location:  [facility] OR 2 /  [facility]    Anesthesia Start:  1117 Anesthesia Stop:  1258    Procedures:       ANTERIOR AND POSTERIOR VAGINAL REPAIR, ENTEROCELE REPAIR (N/A Vagina)      CYSTOSCOPY (N/A Urethra) Diagnosis:       Midline cystocele      (Midline cystocele [N81.11])    Surgeon:  Teressa Gomes MD Provider:  Long Vargas CRNA    Anesthesia Type:  general ASA Status:  2          Anesthesia Type: general  Last vitals  BP   120/72 (08/28/18 0948)   Temp   97.4 °F (36.3 °C) (08/28/18 0948)   Pulse   59 (08/28/18 0948)   Resp   18 (08/28/18 0948)     SpO2   99 % (08/28/18 0948)     Post Anesthesia Care and Evaluation    Patient location during evaluation: PACU  Patient participation: complete - patient participated  Level of consciousness: responsive to verbal stimuli  Pain score: 0  Pain management: satisfactory to patient  Airway patency: patent  Anesthetic complications: No anesthetic complications  PONV Status: none  Cardiovascular status: acceptable and hemodynamically stable  Respiratory status: acceptable  Hydration status: acceptable

## 2018-08-28 NOTE — ANESTHESIA PROCEDURE NOTES
Airway  Urgency: elective    Airway not difficult    General Information and Staff    Patient location during procedure: OR  CRNA: MARISSA GARCIA    Indications and Patient Condition  Indications for airway management: airway protection    Preoxygenated: yes  Mask difficulty assessment: 1 - vent by mask    Final Airway Details  Final airway type: endotracheal airway      Successful airway: ETT  Cuffed: yes   Successful intubation technique: direct laryngoscopy  Facilitating devices/methods: cricoid pressure  Endotracheal tube insertion site: oral  Blade: Neves  Blade size: 2  ETT size: 7.0 mm  Cormack-Lehane Classification: grade IIa - partial view of glottis  Placement verified by: chest auscultation and capnometry   Measured from: lips  ETT to lips (cm): 20  Number of attempts at approach: 1

## 2018-08-28 NOTE — ANESTHESIA PREPROCEDURE EVALUATION
Anesthesia Evaluation     Patient summary reviewed and Nursing notes reviewed   history of anesthetic complications: prolonged sedation  NPO Solid Status: > 8 hours  NPO Liquid Status: > 8 hours           Airway   Mallampati: II  TM distance: <3 FB  Neck ROM: full  No difficulty expected  Dental - normal exam     Pulmonary - normal exam   (-) not a smoker  Cardiovascular - normal exam    (+) hyperlipidemia,       Neuro/Psych  (+) headaches,     GI/Hepatic/Renal/Endo    (+)  GERD,      Musculoskeletal     (+) arthralgias, back pain, chronic pain, myalgias,   Abdominal  - normal exam   Substance History      OB/GYN          Other   (+) arthritis   history of cancer    ROS/Med Hx Other: Labs reviewed  ekg sb irbbb/ ekg sr  Echo normal lv ef 60-60%                Anesthesia Plan    ASA 2     general   (Risks and benefits discussed including risk of aspiration, recall and dental damage. All patient questions answered. Will continue with POC.)  intravenous induction   Anesthetic plan and risks discussed with patient.

## 2018-08-29 LAB
DEPRECATED RDW RBC AUTO: 40.8 FL (ref 37–54)
ERYTHROCYTE [DISTWIDTH] IN BLOOD BY AUTOMATED COUNT: 12.1 % (ref 11.5–14.5)
HCT VFR BLD AUTO: 35.8 % (ref 37–47)
HGB BLD-MCNC: 12 G/DL (ref 12–16)
MCH RBC QN AUTO: 31 PG (ref 27–31)
MCHC RBC AUTO-ENTMCNC: 33.5 G/DL (ref 30–37)
MCV RBC AUTO: 92.5 FL (ref 81–99)
PLATELET # BLD AUTO: 237 10*3/MM3 (ref 130–400)
PMV BLD AUTO: 10.1 FL (ref 6–12)
RBC # BLD AUTO: 3.87 10*6/MM3 (ref 4.2–5.4)
WBC NRBC COR # BLD: 15.95 10*3/MM3 (ref 4.8–10.8)

## 2018-08-29 PROCEDURE — A9270 NON-COVERED ITEM OR SERVICE: HCPCS | Performed by: OBSTETRICS & GYNECOLOGY

## 2018-08-29 PROCEDURE — 63710000001 OXYCODONE-ACETAMINOPHEN 5-325 MG TABLET: Performed by: OBSTETRICS & GYNECOLOGY

## 2018-08-29 PROCEDURE — 85027 COMPLETE CBC AUTOMATED: CPT | Performed by: OBSTETRICS & GYNECOLOGY

## 2018-08-29 PROCEDURE — A9270 NON-COVERED ITEM OR SERVICE: HCPCS | Performed by: NURSE PRACTITIONER

## 2018-08-29 PROCEDURE — 99024 POSTOP FOLLOW-UP VISIT: CPT | Performed by: MIDWIFE

## 2018-08-29 PROCEDURE — G0378 HOSPITAL OBSERVATION PER HR: HCPCS

## 2018-08-29 PROCEDURE — 63710000001 IBUPROFEN 600 MG TABLET: Performed by: OBSTETRICS & GYNECOLOGY

## 2018-08-29 PROCEDURE — 25010000002 ONDANSETRON PER 1 MG: Performed by: OBSTETRICS & GYNECOLOGY

## 2018-08-29 PROCEDURE — 63710000001 ALUMINUM-MAGNESIUM HYDROXIDE-SIMETHICONE 400-400-40 MG/5ML SUSPENSION: Performed by: NURSE PRACTITIONER

## 2018-08-29 PROCEDURE — 63710000001 PANTOPRAZOLE 40 MG TABLET DELAYED-RELEASE: Performed by: NURSE PRACTITIONER

## 2018-08-29 RX ADMIN — IBUPROFEN 600 MG: 600 TABLET ORAL at 17:41

## 2018-08-29 RX ADMIN — PANTOPRAZOLE SODIUM 40 MG: 40 TABLET, DELAYED RELEASE ORAL at 07:57

## 2018-08-29 RX ADMIN — ALUMINUM HYDROXIDE, MAGNESIUM HYDROXIDE, AND DIMETHICONE 15 ML: 400; 400; 40 SUSPENSION ORAL at 00:15

## 2018-08-29 RX ADMIN — DEXTROSE AND SODIUM CHLORIDE 125 ML/HR: 5; 450 INJECTION, SOLUTION INTRAVENOUS at 17:52

## 2018-08-29 RX ADMIN — OXYCODONE HYDROCHLORIDE AND ACETAMINOPHEN 2 TABLET: 5; 325 TABLET ORAL at 04:59

## 2018-08-29 RX ADMIN — DEXTROSE AND SODIUM CHLORIDE 125 ML/HR: 5; 450 INJECTION, SOLUTION INTRAVENOUS at 11:50

## 2018-08-29 RX ADMIN — ONDANSETRON HYDROCHLORIDE 4 MG: 2 INJECTION, SOLUTION INTRAMUSCULAR; INTRAVENOUS at 04:59

## 2018-08-29 RX ADMIN — OXYCODONE HYDROCHLORIDE AND ACETAMINOPHEN 2 TABLET: 5; 325 TABLET ORAL at 23:23

## 2018-08-29 RX ADMIN — DEXTROSE AND SODIUM CHLORIDE 125 ML/HR: 5; 450 INJECTION, SOLUTION INTRAVENOUS at 04:09

## 2018-08-30 VITALS
OXYGEN SATURATION: 96 % | TEMPERATURE: 98.1 F | RESPIRATION RATE: 18 BRPM | HEART RATE: 65 BPM | SYSTOLIC BLOOD PRESSURE: 149 MMHG | DIASTOLIC BLOOD PRESSURE: 69 MMHG

## 2018-08-30 PROCEDURE — 99024 POSTOP FOLLOW-UP VISIT: CPT | Performed by: NURSE PRACTITIONER

## 2018-08-30 PROCEDURE — G0378 HOSPITAL OBSERVATION PER HR: HCPCS

## 2018-08-30 RX ORDER — PSEUDOEPHEDRINE HCL 30 MG
100 TABLET ORAL 2 TIMES DAILY
Qty: 60 EACH | Refills: 0 | Status: SHIPPED | OUTPATIENT
Start: 2018-08-30 | End: 2018-09-29

## 2018-08-30 RX ORDER — OXYCODONE HYDROCHLORIDE AND ACETAMINOPHEN 5; 325 MG/1; MG/1
2 TABLET ORAL EVERY 4 HOURS PRN
Qty: 30 TABLET | Refills: 0 | Status: SHIPPED | OUTPATIENT
Start: 2018-08-30 | End: 2018-09-02

## 2018-08-30 RX ORDER — SULFAMETHOXAZOLE AND TRIMETHOPRIM 800; 160 MG/1; MG/1
1 TABLET ORAL 2 TIMES DAILY
Qty: 16 TABLET | Refills: 0 | Status: SHIPPED | OUTPATIENT
Start: 2018-08-30 | End: 2018-09-07

## 2018-08-30 RX ADMIN — DEXTROSE AND SODIUM CHLORIDE 125 ML/HR: 5; 450 INJECTION, SOLUTION INTRAVENOUS at 01:58

## 2018-08-31 ENCOUNTER — TELEPHONE (OUTPATIENT)
Dept: OBSTETRICS AND GYNECOLOGY | Facility: CLINIC | Age: 68
End: 2018-08-31

## 2018-09-03 ENCOUNTER — HOSPITAL ENCOUNTER (EMERGENCY)
Facility: HOSPITAL | Age: 68
Discharge: HOME | End: 2018-09-03
Attending: EMERGENCY MEDICINE | Admitting: EMERGENCY MEDICINE

## 2018-09-03 VITALS
SYSTOLIC BLOOD PRESSURE: 128 MMHG | DIASTOLIC BLOOD PRESSURE: 76 MMHG | TEMPERATURE: 97.8 F | BODY MASS INDEX: 23.32 KG/M2 | HEART RATE: 62 BPM | OXYGEN SATURATION: 97 % | WEIGHT: 131.6 LBS | RESPIRATION RATE: 16 BRPM | HEIGHT: 63 IN

## 2018-09-03 DIAGNOSIS — T83.9XXA PROBLEM WITH FOLEY CATHETER, INITIAL ENCOUNTER (HCC): Primary | ICD-10-CM

## 2018-09-03 LAB
LAB AP CASE REPORT: NORMAL
PATH REPORT.FINAL DX SPEC: NORMAL

## 2018-09-03 PROCEDURE — 99282 EMERGENCY DEPT VISIT SF MDM: CPT

## 2018-09-03 NOTE — ED NOTES
PT's catheter in place and draining. Pt was given instructions on catheter care. Pt's incisions in perineum are intact, with no redness or swelling noted. No drainage noted     Romana Rosas RN  09/03/18 0525

## 2018-09-03 NOTE — ED PROVIDER NOTES
Subjective   History of Present Illness   68-year-old female who is 4 days postop from rectocele and cystocele repair presenting with concern for vaginal odor and that showing repeating around the catheter she hasn't place.  States that she noticed this over the last day.  Denies any new vaginal discharge, fevers, chills, swelling or pain at her insight other than what she had expected.  Has a follow-up on Thursday with her OB/GYN.    Review of Systems   Skin: Positive for wound.   All other systems reviewed and are negative.      Past Medical History:   Diagnosis Date   • Arthritis     REPORTS BOTHERSOME IN HER NECK   • Body piercing     EARS   • Cancer (CMS/HCC)     SKIN (REMOVED FROM NOSE)   • Elevated cholesterol    • Fibromyalgia    • GERD (gastroesophageal reflux disease)    • H/O bone density study 2015   • H/O exercise stress test     REPORTS APPROXIMATELY 2014 AND THAT ALL WAS WNL'S   • H/O mammogram 06/25/2016   • Headache    • Hearing loss     REPORTS MINIMAL AND ONLY WITH CERTAIN FREQUENCIES. NO HEARING AIDS   • History of bronchitis    • History of TMJ syndrome    • Hyperlipemia    • IBS (irritable bowel syndrome)    • IBS (irritable bowel syndrome)    • Rectocele    • Seasonal allergies    • TB (tuberculosis)     REPORTS +TB SKIN TEST IN THE 80'S.  REPORTS SHE HAD TREATMENT FOR 1 YEAR BUT NO ACTIVE DISEASE.    • Wears glasses     NON RX FOR READING       Allergies   Allergen Reactions   • Prednisone Other (See Comments)     REPORTS CAUSED VISUAL DISTURBANCE (ACUTE BLINDNESS) AND HYPOGLYCEMIA.  REPORTS SHE IS ABLE TO TAKE STEROID INJECTIONS, BUT THAT SHE IS NOT ABLE TO TOLERATE ORAL DOSES FOR PROLONGED PERIODS OF TIME.       Past Surgical History:   Procedure Laterality Date   • ANTERIOR AND POSTERIOR VAGINAL REPAIR N/A 8/28/2018    Procedure: ANTERIOR AND POSTERIOR VAGINAL REPAIR, ENTEROCELE REPAIR;  Surgeon: Teressa Gomes MD;  Location: [facility];  Service: Obstetrics/Gynecology   • BILATERAL  OOPHORECTOMY     • BLADDER REPAIR  2001   • COLONOSCOPY  2011   • CYST REMOVAL      POSTERIOR UPPER LEFT THIGH AND LEFT SIDE OF NECK   • CYSTOSCOPY N/A 8/28/2018    Procedure: CYSTOSCOPY;  Surgeon: Teressa Gomes MD;  Location: [facility];  Service: Obstetrics/Gynecology   • ELBOW ARTHROSCOPY Right 1996   • ENDOSCOPY     • EYE SURGERY Bilateral     CATARACT EXTRACTIONS   • GALLBLADDER SURGERY  2006   • HYSTERECTOMY  2002   • KNEE SURGERY      REPORTS 3 SURGERIES LEFT KNEE, 1 SURGERY RIGHT KNEE   • SKIN BIOPSY      REPORTS SKIN CANCER REMOVED FROM NOSE   • TUBAL ABDOMINAL LIGATION     • WISDOM TOOTH EXTRACTION      EXTRACTIONS ON THE LEFT SIDE (TOP AND BOTTOM)       Family History   Problem Relation Age of Onset   • Ovarian cancer Mother    • Diabetes Mother    • Heart disease Mother    • Osteoporosis Sister    • Diabetes Brother    • Heart disease Brother    • Stroke Brother    • Colon polyps Brother        Social History     Social History   • Marital status:      Social History Main Topics   • Smoking status: Never Smoker   • Smokeless tobacco: Never Used   • Alcohol use No   • Drug use: No   • Sexual activity: Yes     Partners: Male     Birth control/ protection: Surgical     Other Topics Concern   • Not on file           Objective   Physical Exam   Constitutional: She is oriented to person, place, and time. She appears well-developed and well-nourished. No distress.   HENT:   Head: Normocephalic.   Eyes: No scleral icterus.   Cardiovascular: Normal rate, regular rhythm, normal heart sounds and intact distal pulses.  Exam reveals no gallop and no friction rub.    No murmur heard.  Pulmonary/Chest: Effort normal and breath sounds normal. No respiratory distress. She has no wheezes. She has no rales.   Abdominal: Soft. She exhibits no distension and no mass. There is no tenderness. There is no rebound and no guarding.   Genitourinary:   Genitourinary Comments: Urinary catheter in place draining clear yellow  fluid.  Incision site over perineum appears clean, dry, intact.  No abnormal odor.   Musculoskeletal: She exhibits no edema or deformity.   Neurological: She is alert and oriented to person, place, and time.   Skin: Skin is warm and dry. She is not diaphoretic. No erythema. No pallor.   Psychiatric: She has a normal mood and affect. Her behavior is normal.   Nursing note and vitals reviewed.      Procedures           ED Course                  MDM   68-year-old female here with concern for possible odor from incision site.  States she also thinks she may have leaked out her catheter.  On exam, the incision site appears clean, dry, intact evidence of infection.  No foul odor.  Her urinary catheter continues to drain.  Overall, reassuring exam and will discharge home with recommendation to call and follow up with her OB/GYN.      Final diagnoses:   Problem with Landa catheter, initial encounter (CMS/[facility])            Blaine Vang MD  09/03/18 3536

## 2018-09-06 ENCOUNTER — OFFICE VISIT (OUTPATIENT)
Dept: OBSTETRICS AND GYNECOLOGY | Facility: CLINIC | Age: 68
End: 2018-09-06

## 2018-09-06 VITALS — HEIGHT: 63 IN | WEIGHT: 131 LBS | BODY MASS INDEX: 23.21 KG/M2

## 2018-09-06 DIAGNOSIS — Z09 POSTOP CHECK: Primary | ICD-10-CM

## 2018-09-06 PROCEDURE — 99024 POSTOP FOLLOW-UP VISIT: CPT | Performed by: PHYSICIAN ASSISTANT

## 2018-09-06 NOTE — PROGRESS NOTES
Subjective   Chief Complaint   Patient presents with   • Post-op     One week post-op Anterior, Posterior and Enterocele repair;  Cath removed; Cath residual=20cc's       Klaudia Heredia is a 68 y.o. year old  presenting to be seen for post op visit  She is one week post op A&P repair and enterocele repair  Her PVR today is 20 cc  Patient has done well since discharge from hospital. Normal bowel function. Has had very scant bloody discharge   Has not had any significant pelvic or vaginal pain    Past Medical History:   Diagnosis Date   • Arthritis     REPORTS BOTHERSOME IN HER NECK   • Body piercing     EARS   • Cancer (CMS/HCC)     SKIN (REMOVED FROM NOSE)   • Elevated cholesterol    • Fibromyalgia    • GERD (gastroesophageal reflux disease)    • H/O bone density study    • H/O exercise stress test     REPORTS APPROXIMATELY  AND THAT ALL WAS WNL'S   • H/O mammogram 2016   • Headache    • Hearing loss     REPORTS MINIMAL AND ONLY WITH CERTAIN FREQUENCIES. NO HEARING AIDS   • History of bronchitis    • History of TMJ syndrome    • Hyperlipemia    • IBS (irritable bowel syndrome)    • IBS (irritable bowel syndrome)    • Rectocele    • Seasonal allergies    • TB (tuberculosis)     REPORTS +TB SKIN TEST IN THE .  REPORTS SHE HAD TREATMENT FOR 1 YEAR BUT NO ACTIVE DISEASE.    • Wears glasses     NON RX FOR READING        Current Outpatient Prescriptions:   •  Ascorbic Acid (VITAMIN C GUMMIE PO), Take 1 tablet by mouth Daily., Disp: , Rfl:   •  baclofen (LIORESAL) 10 MG tablet, Take 10 mg by mouth 3 (Three) Times a Day., Disp: , Rfl:   •  cholecalciferol (VITAMIN D3) 1000 units tablet, Take 1,000 Units by mouth Daily., Disp: , Rfl:   •  docusate sodium 100 MG capsule, Take 1 capsule by mouth 2 (Two) Times a Day for 30 days., Disp: 60 each, Rfl: 0  •  MULTIPLE VITAMIN PO, Take 1 tablet by mouth Daily., Disp: , Rfl:   •  omeprazole (priLOSEC) 20 MG capsule, Take 20 mg by mouth Daily., Disp: , Rfl:    •  sulfamethoxazole-trimethoprim (BACTRIM DS) 800-160 MG per tablet, Take 1 tablet by mouth 2 (Two) Times a Day for 8 days., Disp: 16 tablet, Rfl: 0  •  vitamin B-12 (CYANOCOBALAMIN) 1000 MCG tablet, Take 1,000 mcg by mouth Daily., Disp: , Rfl:   •  atorvastatin (LIPITOR) 80 MG tablet, Take 80 mg by mouth Daily., Disp: , Rfl:   •  meloxicam (MOBIC) 15 MG tablet, Take 15 mg by mouth Daily As Needed for Mild Pain ., Disp: , Rfl:   •  tiZANidine (ZANAFLEX) 4 MG tablet, Take 4 mg by mouth At Night As Needed for Muscle Spasms., Disp: , Rfl:    Allergies   Allergen Reactions   • Prednisone Other (See Comments)     REPORTS CAUSED VISUAL DISTURBANCE (ACUTE BLINDNESS) AND HYPOGLYCEMIA.  REPORTS SHE IS ABLE TO TAKE STEROID INJECTIONS, BUT THAT SHE IS NOT ABLE TO TOLERATE ORAL DOSES FOR PROLONGED PERIODS OF TIME.      Past Surgical History:   Procedure Laterality Date   • ANTERIOR AND POSTERIOR VAGINAL REPAIR N/A 8/28/2018    Procedure: ANTERIOR AND POSTERIOR VAGINAL REPAIR, ENTEROCELE REPAIR;  Surgeon: Teressa Gomes MD;  Location: [facility];  Service: Obstetrics/Gynecology   • BILATERAL OOPHORECTOMY     • BLADDER REPAIR  2001   • COLONOSCOPY  2011   • CYST REMOVAL      POSTERIOR UPPER LEFT THIGH AND LEFT SIDE OF NECK   • CYSTOSCOPY N/A 8/28/2018    Procedure: CYSTOSCOPY;  Surgeon: Teressa Gomes MD;  Location: [facility];  Service: Obstetrics/Gynecology   • ELBOW ARTHROSCOPY Right 1996   • ENDOSCOPY     • EYE SURGERY Bilateral     CATARACT EXTRACTIONS   • GALLBLADDER SURGERY  2006   • HYSTERECTOMY  2002   • KNEE SURGERY      REPORTS 3 SURGERIES LEFT KNEE, 1 SURGERY RIGHT KNEE   • SKIN BIOPSY      REPORTS SKIN CANCER REMOVED FROM NOSE   • TUBAL ABDOMINAL LIGATION     • WISDOM TOOTH EXTRACTION      EXTRACTIONS ON THE LEFT SIDE (TOP AND BOTTOM)      Social History     Social History   • Marital status:      Spouse name: N/A   • Number of children: N/A   • Years of education: N/A     Occupational History   • Not on file.  "    Social History Main Topics   • Smoking status: Never Smoker   • Smokeless tobacco: Never Used   • Alcohol use No   • Drug use: No   • Sexual activity: Yes     Partners: Male     Birth control/ protection: Surgical     Other Topics Concern   • Not on file     Social History Narrative   • No narrative on file      Family History   Problem Relation Age of Onset   • Ovarian cancer Mother    • Diabetes Mother    • Heart disease Mother    • Osteoporosis Sister    • Diabetes Brother    • Heart disease Brother    • Stroke Brother    • Colon polyps Brother        Review of Systems   Constitutional: Negative.    Gastrointestinal: Negative.    Genitourinary: Positive for vaginal pain. Negative for vaginal bleeding.           Objective   Ht 160 cm (63\")   Wt 59.4 kg (131 lb)   LMP  (LMP Unknown)   Breastfeeding? No   BMI 23.21 kg/m²     Physical Exam         Assessment and Plan  Klaudia was seen today for post-op.    Diagnoses and all orders for this visit:    Postop check      Patient Instructions   Follow up in 5 weeks or prn  No driving for one more week  Light activity until next appointment             This note was electronically signed.    Wandy Hunt PA-C   September 6, 2018  "

## 2018-10-11 ENCOUNTER — OFFICE VISIT (OUTPATIENT)
Dept: OBSTETRICS AND GYNECOLOGY | Facility: CLINIC | Age: 68
End: 2018-10-11

## 2018-10-11 VITALS
HEIGHT: 63 IN | WEIGHT: 131 LBS | BODY MASS INDEX: 23.21 KG/M2 | DIASTOLIC BLOOD PRESSURE: 70 MMHG | SYSTOLIC BLOOD PRESSURE: 128 MMHG

## 2018-10-11 DIAGNOSIS — Z09 POSTOP CHECK: Primary | ICD-10-CM

## 2018-10-11 PROCEDURE — 99024 POSTOP FOLLOW-UP VISIT: CPT | Performed by: OBSTETRICS & GYNECOLOGY

## 2018-10-11 RX ORDER — ESTRADIOL 0.1 MG/G
CREAM VAGINAL
Qty: 30 G | Refills: 11 | Status: SHIPPED | OUTPATIENT
Start: 2018-10-11 | End: 2021-11-11

## 2018-10-11 NOTE — PROGRESS NOTES
Subjective  Chief Complaint   Patient presents with   • Post-op     6 weeks post A&P repair and Enterocele, no complaints, questions about returning to normal activity      Patient is 68 y.o.  here for postoperative check.  Pt reports doing well.  Pt has been doing light activities at home.  Pt has not had any vaginal spotting now x 3 weeks or more.  Pt reports feeling good.  Pt with normal urination; denies leaking any urine.  Pt with normal bms.  Pt has not resumed strenuous activities; plays tennis and other sports.    History  Past Medical History:   Diagnosis Date   • Arthritis     REPORTS BOTHERSOME IN HER NECK   • Body piercing     EARS   • Cancer (CMS/HCC)     SKIN (REMOVED FROM NOSE)   • Elevated cholesterol    • Fibromyalgia    • GERD (gastroesophageal reflux disease)    • H/O bone density study    • H/O exercise stress test     REPORTS APPROXIMATELY  AND THAT ALL WAS WNL'S   • H/O mammogram 2016   • Headache    • Hearing loss     REPORTS MINIMAL AND ONLY WITH CERTAIN FREQUENCIES. NO HEARING AIDS   • History of bronchitis    • History of TMJ syndrome    • Hyperlipemia    • IBS (irritable bowel syndrome)    • IBS (irritable bowel syndrome)    • Rectocele    • Seasonal allergies    • TB (tuberculosis)     REPORTS +TB SKIN TEST IN THE .  REPORTS SHE HAD TREATMENT FOR 1 YEAR BUT NO ACTIVE DISEASE.    • Wears glasses     NON RX FOR READING     Current Outpatient Prescriptions on File Prior to Visit   Medication Sig Dispense Refill   • Ascorbic Acid (VITAMIN C GUMMIE PO) Take 1 tablet by mouth Daily.     • atorvastatin (LIPITOR) 80 MG tablet Take 80 mg by mouth Daily.     • baclofen (LIORESAL) 10 MG tablet Take 10 mg by mouth 3 (Three) Times a Day.     • cholecalciferol (VITAMIN D3) 1000 units tablet Take 1,000 Units by mouth Daily.     • meloxicam (MOBIC) 15 MG tablet Take 15 mg by mouth Daily As Needed for Mild Pain .     • MULTIPLE VITAMIN PO Take 1 tablet by mouth Daily.     •  omeprazole (priLOSEC) 20 MG capsule Take 20 mg by mouth Daily.     • tiZANidine (ZANAFLEX) 4 MG tablet Take 4 mg by mouth At Night As Needed for Muscle Spasms.     • vitamin B-12 (CYANOCOBALAMIN) 1000 MCG tablet Take 1,000 mcg by mouth Daily.       No current facility-administered medications on file prior to visit.      Allergies   Allergen Reactions   • Prednisone Other (See Comments)     REPORTS CAUSED VISUAL DISTURBANCE (ACUTE BLINDNESS) AND HYPOGLYCEMIA.  REPORTS SHE IS ABLE TO TAKE STEROID INJECTIONS, BUT THAT SHE IS NOT ABLE TO TOLERATE ORAL DOSES FOR PROLONGED PERIODS OF TIME.     Past Surgical History:   Procedure Laterality Date   • ANTERIOR AND POSTERIOR VAGINAL REPAIR N/A 8/28/2018    Procedure: ANTERIOR AND POSTERIOR VAGINAL REPAIR, ENTEROCELE REPAIR;  Surgeon: Teressa Gomes MD;  Location: [facility];  Service: Obstetrics/Gynecology   • BILATERAL OOPHORECTOMY     • BLADDER REPAIR  2001   • COLONOSCOPY  2011   • CYST REMOVAL      POSTERIOR UPPER LEFT THIGH AND LEFT SIDE OF NECK   • CYSTOSCOPY N/A 8/28/2018    Procedure: CYSTOSCOPY;  Surgeon: Teressa Gomes MD;  Location: [facility];  Service: Obstetrics/Gynecology   • ELBOW ARTHROSCOPY Right 1996   • ENDOSCOPY     • EYE SURGERY Bilateral     CATARACT EXTRACTIONS   • GALLBLADDER SURGERY  2006   • HYSTERECTOMY  2002   • KNEE SURGERY      REPORTS 3 SURGERIES LEFT KNEE, 1 SURGERY RIGHT KNEE   • SKIN BIOPSY      REPORTS SKIN CANCER REMOVED FROM NOSE   • TUBAL ABDOMINAL LIGATION     • WISDOM TOOTH EXTRACTION      EXTRACTIONS ON THE LEFT SIDE (TOP AND BOTTOM)     Family History   Problem Relation Age of Onset   • Ovarian cancer Mother    • Diabetes Mother    • Heart disease Mother    • Osteoporosis Sister    • Diabetes Brother    • Heart disease Brother    • Stroke Brother    • Colon polyps Brother      Social History     Social History   • Marital status:      Social History Main Topics   • Smoking status: Never Smoker   • Smokeless tobacco: Never Used   •  "Alcohol use No   • Drug use: No   • Sexual activity: Yes     Partners: Male     Birth control/ protection: Surgical     Other Topics Concern   • Not on file     Review of Systems  The following systems were reviewed and negative:  constitution, eyes, ENT, respiratory, cardiovascular, gastrointestinal, genitourinary, integument, breast, hematologic / lymphatic, musculoskeletal, neurological, behavioral/psych, endocrine and allergies / immunologic     Objective  Vitals:    10/11/18 1306   BP: 128/70   Weight: 59.4 kg (131 lb)   Height: 160 cm (63\")     Physical Exam:  General Appearance: alert, appears stated age and cooperative  Head: normocephalic, without obvious abnormality and atraumatic  Eyes: lids and lashes normal, conjunctivae and sclerae normal, no icterus, no pallor, corneas clear and PERRLA  Ears: ears appear intact with no abnormalities noted  Nose: nares normal, septum midline, mucosa normal and no drainage  Neck: suppple, trachea midline and no thyromegaly  Lungs: clear to auscultation, respirations regular, respirations even and respirations unlabored  Heart: regular rhythm and normal rate, normal S1, S2, no murmur, gallop, or rubs and no click  Breasts: Not performed.  Abdomen: normal bowel sounds, no masses, no hepatomegaly, no splenomegaly, soft non-tender, no guarding and no rebound tenderness  Pelvic: Clinical staff was present for exam  External genitalia:  normal appearance of the external genitalia including Bartholin's and [place]'s glands.  :  urethral meatus normal;  Vaginal:  atrophic mucosal changes are present; cuff has healed well; good uv support  Cervix:  absent.  Uterus:  absent.  Adnexa:  non palpable bilaterally.  Extremities: moves extremities well, no edema, no cyanosis and no redness  Skin: no bleeding, bruising or rash and no lesions noted  Lymph Nodes: no palpable adenopathy  Neuro: CN II-X grossly intact; sensation intact  Psych: normal mood and affect, oriented to person, " time and place, thought content organized and appropriate judgment  Lab Review   No data reviewed    Imaging   No data reviewed    Assessment/Plan  Problem List Items Addressed This Visit     None      Visit Diagnoses     Postop check    -  Primary  Normal postoperative exam.  Instructions and precautions given regarding activities and f/u.  Rx estrace cream given.  Pt instructed in use of lubricants with intercourse and use of vaginal cream as rx.    Relevant Medications    estradiol (ESTRACE) 0.1 MG/GM vaginal cream        Follow up as discussed/scheduled  This note was electronically signed.  Teressa Gomes M.D.

## 2021-11-11 ENCOUNTER — OFFICE VISIT (OUTPATIENT)
Dept: GASTROENTEROLOGY | Facility: CLINIC | Age: 71
End: 2021-11-11

## 2021-11-11 ENCOUNTER — LAB (OUTPATIENT)
Dept: LAB | Facility: HOSPITAL | Age: 71
End: 2021-11-11

## 2021-11-11 VITALS
SYSTOLIC BLOOD PRESSURE: 127 MMHG | TEMPERATURE: 98 F | WEIGHT: 131 LBS | DIASTOLIC BLOOD PRESSURE: 70 MMHG | HEART RATE: 62 BPM | BODY MASS INDEX: 23.21 KG/M2 | RESPIRATION RATE: 16 BRPM | HEIGHT: 63 IN

## 2021-11-11 DIAGNOSIS — R15.9 INCONTINENCE OF FECES, UNSPECIFIED FECAL INCONTINENCE TYPE: Chronic | ICD-10-CM

## 2021-11-11 DIAGNOSIS — Z12.11 ENCOUNTER FOR SCREENING FOR MALIGNANT NEOPLASM OF COLON: ICD-10-CM

## 2021-11-11 DIAGNOSIS — R19.7 DIARRHEA, UNSPECIFIED TYPE: Primary | Chronic | ICD-10-CM

## 2021-11-11 DIAGNOSIS — R19.7 DIARRHEA, UNSPECIFIED TYPE: ICD-10-CM

## 2021-11-11 DIAGNOSIS — K21.9 GASTROESOPHAGEAL REFLUX DISEASE WITHOUT ESOPHAGITIS: Chronic | ICD-10-CM

## 2021-11-11 LAB
ALBUMIN SERPL-MCNC: 4.5 G/DL (ref 3.5–5.2)
ALBUMIN/GLOB SERPL: 1.9 G/DL
ALP SERPL-CCNC: 106 U/L (ref 39–117)
ALT SERPL W P-5'-P-CCNC: 18 U/L (ref 1–33)
ANION GAP SERPL CALCULATED.3IONS-SCNC: 6.9 MMOL/L (ref 5–15)
AST SERPL-CCNC: 20 U/L (ref 1–32)
BILIRUB SERPL-MCNC: 0.7 MG/DL (ref 0–1.2)
BUN SERPL-MCNC: 9 MG/DL (ref 8–23)
BUN/CREAT SERPL: 13.2 (ref 7–25)
CALCIUM SPEC-SCNC: 9.5 MG/DL (ref 8.6–10.5)
CHLORIDE SERPL-SCNC: 104 MMOL/L (ref 98–107)
CO2 SERPL-SCNC: 27.1 MMOL/L (ref 22–29)
CREAT SERPL-MCNC: 0.68 MG/DL (ref 0.57–1)
DEPRECATED RDW RBC AUTO: 40 FL (ref 37–54)
ERYTHROCYTE [DISTWIDTH] IN BLOOD BY AUTOMATED COUNT: 12.1 % (ref 12.3–15.4)
GFR SERPL CREATININE-BSD FRML MDRD: 86 ML/MIN/1.73
GLOBULIN UR ELPH-MCNC: 2.4 GM/DL
GLUCOSE SERPL-MCNC: 94 MG/DL (ref 65–99)
HCT VFR BLD AUTO: 40.6 % (ref 34–46.6)
HGB BLD-MCNC: 14.1 G/DL (ref 12–15.9)
IGA1 MFR SER: 275 MG/DL (ref 70–400)
MCH RBC QN AUTO: 31.5 PG (ref 26.6–33)
MCHC RBC AUTO-ENTMCNC: 34.7 G/DL (ref 31.5–35.7)
MCV RBC AUTO: 90.8 FL (ref 79–97)
PLATELET # BLD AUTO: 340 10*3/MM3 (ref 140–450)
PMV BLD AUTO: 10.3 FL (ref 6–12)
POTASSIUM SERPL-SCNC: 3.8 MMOL/L (ref 3.5–5.2)
PROT SERPL-MCNC: 6.9 G/DL (ref 6–8.5)
RBC # BLD AUTO: 4.47 10*6/MM3 (ref 3.77–5.28)
SODIUM SERPL-SCNC: 138 MMOL/L (ref 136–145)
TSH SERPL DL<=0.05 MIU/L-ACNC: 1.33 UIU/ML (ref 0.27–4.2)
WBC # BLD AUTO: 7.47 10*3/MM3 (ref 3.4–10.8)

## 2021-11-11 PROCEDURE — 85027 COMPLETE CBC AUTOMATED: CPT

## 2021-11-11 PROCEDURE — 84443 ASSAY THYROID STIM HORMONE: CPT

## 2021-11-11 PROCEDURE — 83516 IMMUNOASSAY NONANTIBODY: CPT

## 2021-11-11 PROCEDURE — 99204 OFFICE O/P NEW MOD 45 MIN: CPT | Performed by: NURSE PRACTITIONER

## 2021-11-11 PROCEDURE — 36415 COLL VENOUS BLD VENIPUNCTURE: CPT

## 2021-11-11 PROCEDURE — 80053 COMPREHEN METABOLIC PANEL: CPT

## 2021-11-11 PROCEDURE — 82784 ASSAY IGA/IGD/IGG/IGM EACH: CPT

## 2021-11-11 RX ORDER — NAPROXEN 500 MG/1
500 TABLET ORAL AS NEEDED
COMMUNITY
End: 2022-06-14 | Stop reason: HOSPADM

## 2021-11-11 RX ORDER — IBUPROFEN 200 MG
200 TABLET ORAL NIGHTLY PRN
COMMUNITY
End: 2022-07-20

## 2021-11-11 RX ORDER — GABAPENTIN 300 MG/1
300 CAPSULE ORAL AS NEEDED
COMMUNITY
End: 2022-01-12

## 2021-11-11 RX ORDER — DULOXETIN HYDROCHLORIDE 30 MG/1
30 CAPSULE, DELAYED RELEASE ORAL DAILY
COMMUNITY
End: 2022-07-20 | Stop reason: SDUPTHER

## 2021-11-11 RX ORDER — LEVOCETIRIZINE DIHYDROCHLORIDE 5 MG/1
5 TABLET, FILM COATED ORAL EVERY EVENING
COMMUNITY

## 2021-11-11 NOTE — PROGRESS NOTES
"     New Patient Consult      Date: 2021   Patient Name: Klaudia Heredia  MRN: [id number]  : 1950     Primary Care Provider: Trena Arias MD    Chief Complaint   Patient presents with   • Fecal Incontinence   • Diarrhea     History of Present Illness: Klaudia Heredia is a 71 y.o. female who is here today to establish care with Gastroenterology for diarrhea.    She has a history of diarrhea for many years with 1-5 loose to watery bowel movements per day. She has occasional incontinence of bowels and she is not aware it has happened. She has urgency associated with bowel movements. Eating makes symptoms worse.  No abdominal pain. No rectal bleeding. She has occasional vomiting during a bowel movement, but this is unchanged.  Her last colonoscopy was about 3 months ago at [facility], she was told everything was \"normal\". Results are not available.    She has a history of severe reflux. She is taking Omeprazole 20 mg daily with reasonable control of reflux. No difficulty swallowing.     There is no family history of GI malignancy or IBD.     Subjective      Past Medical History:   Diagnosis Date   • Arthritis     REPORTS BOTHERSOME IN HER NECK   • Body piercing     EARS   • Cancer (HCC)     SKIN (REMOVED FROM NOSE)   • Elevated cholesterol    • Fibromyalgia    • GERD (gastroesophageal reflux disease)    • H/O bone density study    • H/O exercise stress test     REPORTS APPROXIMATELY 2014 AND THAT ALL WAS WNL'S   • H/O mammogram 2016   • Headache    • Hearing loss     REPORTS MINIMAL AND ONLY WITH CERTAIN FREQUENCIES. NO HEARING AIDS   • History of bronchitis    • History of TMJ syndrome    • Hyperlipemia    • IBS (irritable bowel syndrome)    • IBS (irritable bowel syndrome)    • Rectocele    • Seasonal allergies    • TB (tuberculosis)     REPORTS +TB SKIN TEST IN THE S.  REPORTS SHE HAD TREATMENT FOR 1 YEAR BUT NO ACTIVE DISEASE.    • Wears glasses     NON RX FOR READING "     Past Surgical History:   Procedure Laterality Date   • ANTERIOR AND POSTERIOR VAGINAL REPAIR N/A 8/28/2018    Procedure: ANTERIOR AND POSTERIOR VAGINAL REPAIR, ENTEROCELE REPAIR;  Surgeon: Teressa Gomes MD;  Location: [facility];  Service: Obstetrics/Gynecology   • BILATERAL OOPHORECTOMY     • BLADDER REPAIR  2001   • COLONOSCOPY  2011   • COLONOSCOPY  05/25/2021   • CYST REMOVAL      POSTERIOR UPPER LEFT THIGH AND LEFT SIDE OF NECK   • CYSTOSCOPY N/A 8/28/2018    Procedure: CYSTOSCOPY;  Surgeon: Teressa Gomes MD;  Location: [facility];  Service: Obstetrics/Gynecology   • ELBOW ARTHROSCOPY Right 1996   • ENDOSCOPY  5 years ago   • EYE SURGERY Bilateral     CATARACT EXTRACTIONS   • GALLBLADDER SURGERY  2006   • HYSTERECTOMY  2002   • KNEE SURGERY      REPORTS 3 SURGERIES LEFT KNEE, 1 SURGERY RIGHT KNEE   • SKIN BIOPSY      REPORTS SKIN CANCER REMOVED FROM NOSE   • TUBAL ABDOMINAL LIGATION     • WISDOM TOOTH EXTRACTION      EXTRACTIONS ON THE LEFT SIDE (TOP AND BOTTOM)     Family History   Problem Relation Age of Onset   • Ovarian cancer Mother    • Diabetes Mother    • Heart disease Mother    • Osteoporosis Sister    • Diabetes Brother    • Heart disease Brother    • Stroke Brother    • Colon polyps Brother    • Colon cancer Neg Hx      Social History     Socioeconomic History   • Marital status:    Tobacco Use   • Smoking status: Never Smoker   • Smokeless tobacco: Never Used   Vaping Use   • Vaping Use: Never used   Substance and Sexual Activity   • Alcohol use: No   • Drug use: No   • Sexual activity: Yes     Partners: Male     Birth control/protection: Surgical       Current Outpatient Medications:   •  Ascorbic Acid (VITAMIN C GUMMIE PO), Take 1 tablet by mouth Daily., Disp: , Rfl:   •  atorvastatin (LIPITOR) 80 MG tablet, Take 80 mg by mouth Daily., Disp: , Rfl:   •  cholecalciferol (VITAMIN D3) 1000 units tablet, Take 1,000 Units by mouth Daily., Disp: , Rfl:   •  DULoxetine (CYMBALTA) 30 MG capsule,  Take 30 mg by mouth Daily., Disp: , Rfl:   •  gabapentin (NEURONTIN) 300 MG capsule, Take 300 mg by mouth As Needed., Disp: , Rfl:   •  ibuprofen (ADVIL,MOTRIN) 200 MG tablet, Take 200 mg by mouth Every Night., Disp: , Rfl:   •  levocetirizine (XYZAL) 5 MG tablet, Take 5 mg by mouth Every Evening., Disp: , Rfl:   •  MULTIPLE VITAMIN PO, Take 1 tablet by mouth Daily., Disp: , Rfl:   •  naproxen (NAPROSYN) 500 MG tablet, Take 500 mg by mouth As Needed for Mild Pain ., Disp: , Rfl:   •  omeprazole (priLOSEC) 20 MG capsule, Take 20 mg by mouth Daily., Disp: , Rfl:   •  tiZANidine (ZANAFLEX) 4 MG tablet, Take 4 mg by mouth At Night As Needed for Muscle Spasms., Disp: , Rfl:   •  vitamin B-12 (CYANOCOBALAMIN) 1000 MCG tablet, Take 1,000 mcg by mouth Daily., Disp: , Rfl:     Allergies   Allergen Reactions   • Prednisone Other (See Comments)     REPORTS CAUSED VISUAL DISTURBANCE (ACUTE BLINDNESS) AND HYPOGLYCEMIA.  REPORTS SHE IS ABLE TO TAKE STEROID INJECTIONS, BUT THAT SHE IS NOT ABLE TO TOLERATE ORAL DOSES FOR PROLONGED PERIODS OF TIME.     The following portions of the patient's history were reviewed and updated as appropriate: allergies, current medications, past family history, past medical history, past social history, past surgical history and problem list.    Objective     Physical Exam  Vitals and nursing note reviewed.   Constitutional:       General: She is not in acute distress.     Appearance: Normal appearance. She is well-developed.   HENT:      Head: Normocephalic and atraumatic.      Right Ear: Hearing normal.      Left Ear: Hearing normal.      Mouth/Throat:      Mouth: Mucous membranes are not pale, not dry and not cyanotic.   Eyes:      General: Lids are normal.      Conjunctiva/sclera: Conjunctivae normal.   Neck:      Trachea: Trachea normal.   Cardiovascular:      Rate and Rhythm: Normal rate and regular rhythm.      Heart sounds: Normal heart sounds.   Pulmonary:      Effort: Pulmonary effort is  "normal. No respiratory distress.      Breath sounds: Normal breath sounds.   Abdominal:      General: Bowel sounds are normal.      Palpations: Abdomen is soft. There is no mass.      Tenderness: There is no abdominal tenderness.      Hernia: No hernia is present.   Skin:     General: Skin is warm and dry.   Neurological:      Mental Status: She is alert and oriented to person, place, and time.   Psychiatric:         Mood and Affect: Mood normal.         Speech: Speech normal.         Behavior: Behavior normal. Behavior is cooperative.       Vitals:    11/11/21 1112   BP: 127/70   Pulse: 62   Resp: 16   Temp: 98 °F (36.7 °C)   Weight: 59.4 kg (131 lb)   Height: 160 cm (63\")     Body mass index is 23.21 kg/m².     Results Review:   I have reviewed the patient's new clinical and imaging results.    No radiology results for the last 90 days.     Assessment / Plan      1. Diarrhea, unspecified type  2. Incontinence of feces, unspecified fecal incontinence type  She has a history of diarrhea with incontinence of bowels for several years with 1-5 loose to watery bowel movements per day.  Eating makes symptoms worse.  No abdominal pain.  No rectal bleeding.  According to the patient, she had normal colonoscopy in May 2021 at [facility].  Unfortunately, we do not have results. No recent labs or abdominal imaging to interpret.  Labs  Stool studies  If labs and stool studies unremarkable, will send in Colestid 1 g 1 p.o. twice a day. Decrease to once a day if constipated.    - CBC (No Diff); Future  - Comprehensive Metabolic Panel; Future  - IgA; Future  - Tissue Transglutaminase, IgA; Future  - TSH; Future  - Clostridium Difficile Toxin, PCR - Stool, Per Rectum; Future  - Calprotectin, Fecal - Stool, Per Rectum; Future  - Pancreatic Elastase, Fecal - Stool, Per Rectum; Future  - Enteric Bacterial Panel - Stool, Per Rectum; Future    3. Gastroesophageal reflux disease without esophagitis  History of reflux for several " years that is reasonably controlled with omeprazole 20 mg daily. Continue same.  No history of difficulty swallowing.  Antireflux measures.    4. Encounter for screening for malignant neoplasm of colon  The patient had colonoscopy in May 2021 at [facility] that was normal according to the patient.  There is no family history of colon cancer.  Will obtain copy of recent colonoscopy.  Colonoscopy for colon cancer screening in 10 years, 2031, unless otherwise noted.    Patient Instructions   1. Labs  2. Stool studies  3. Obtain copies of recent colonoscopy from [facility].  4. Follow up: 8 weeks     Eugene Ledbetter, APRN  11/11/2021    Please note that portions of this note may have been completed with a voice recognition program. Efforts were made to edit the dictations, but occasionally words are mistranscribed.

## 2021-11-11 NOTE — PATIENT INSTRUCTIONS
1. Labs  2. Stool studies  3. Obtain copies of recent colonoscopy from [facility].  4. Follow up: 8 weeks

## 2021-11-12 LAB — TTG IGA SER-ACNC: <2 U/ML (ref 0–3)

## 2021-11-15 LAB — C DIFF TOX GENS STL QL NAA+PROBE: NOT DETECTED

## 2021-11-15 PROCEDURE — 83993 ASSAY FOR CALPROTECTIN FECAL: CPT

## 2021-11-15 PROCEDURE — 82656 EL-1 FECAL QUAL/SEMIQ: CPT

## 2021-11-15 PROCEDURE — 87493 C DIFF AMPLIFIED PROBE: CPT

## 2021-11-15 PROCEDURE — 87427 SHIGA-LIKE TOXIN AG IA: CPT

## 2021-11-15 PROCEDURE — 87046 STOOL CULTR AEROBIC BACT EA: CPT

## 2021-11-15 PROCEDURE — 87045 FECES CULTURE AEROBIC BACT: CPT

## 2021-11-19 LAB
BACTERIA SPEC CULT: NORMAL
BACTERIA SPEC CULT: NORMAL
CAMPYLOBACTER STL CULT: NORMAL
E COLI SXT STL QL IA: NEGATIVE
SALM + SHIG STL CULT: NORMAL

## 2021-11-22 LAB
CALPROTECTIN STL-MCNT: 17 UG/G (ref 0–120)
ELASTASE PANC STL-MCNT: 462 UG ELAST./G

## 2021-11-23 DIAGNOSIS — R19.7 DIARRHEA, UNSPECIFIED TYPE: Primary | ICD-10-CM

## 2021-11-23 DIAGNOSIS — K59.1 FUNCTIONAL DIARRHEA: ICD-10-CM

## 2021-11-23 RX ORDER — MONTELUKAST SODIUM 4 MG/1
1 TABLET, CHEWABLE ORAL 2 TIMES DAILY
Qty: 60 TABLET | Refills: 2 | Status: SHIPPED | OUTPATIENT
Start: 2021-11-23 | End: 2022-01-12 | Stop reason: SDUPTHER

## 2021-12-14 ENCOUNTER — OFFICE VISIT (OUTPATIENT)
Dept: OBSTETRICS AND GYNECOLOGY | Facility: CLINIC | Age: 71
End: 2021-12-14

## 2021-12-14 VITALS
WEIGHT: 133 LBS | BODY MASS INDEX: 23.57 KG/M2 | DIASTOLIC BLOOD PRESSURE: 72 MMHG | SYSTOLIC BLOOD PRESSURE: 128 MMHG | HEIGHT: 63 IN

## 2021-12-14 DIAGNOSIS — R19.7 DIARRHEA, UNSPECIFIED TYPE: ICD-10-CM

## 2021-12-14 DIAGNOSIS — R15.9 INCONTINENCE OF FECES, UNSPECIFIED FECAL INCONTINENCE TYPE: ICD-10-CM

## 2021-12-14 DIAGNOSIS — R32 URINARY INCONTINENCE, UNSPECIFIED TYPE: ICD-10-CM

## 2021-12-14 DIAGNOSIS — N95.2 POSTMENOPAUSAL ATROPHIC VAGINITIS: Primary | ICD-10-CM

## 2021-12-14 PROCEDURE — 99204 OFFICE O/P NEW MOD 45 MIN: CPT | Performed by: OBSTETRICS & GYNECOLOGY

## 2021-12-14 RX ORDER — ESTRADIOL 0.1 MG/G
CREAM VAGINAL
Qty: 1 EACH | Refills: 11 | Status: SHIPPED | OUTPATIENT
Start: 2021-12-14

## 2021-12-14 RX ORDER — ESCITALOPRAM OXALATE 10 MG/1
10 TABLET ORAL DAILY
COMMUNITY
Start: 2021-10-22

## 2021-12-15 NOTE — PROGRESS NOTES
Chief Complaint  Urinary Incontinence (Patient complains of urinary and stool incontinence x 1 year. )     History of Present Illness:  Patient is 71 y.o.  who presents to [facility] as a new patient for evaluation of fecal incontinence as well as urinary incontinence.  Patient reports having symptoms over the last year.  Patient does report she has had symptoms like this in the past multiple times.  Patient reports her symptoms will be intermittent in nature.  Patient will have the fecal incontinence only when she has diarrhea and loose stools.  Patient reports when her stools are formed she has fecal continence.  Patient reports during these episodes she will also have urgency and occasional urge incontinence.  Patient reports she has had numerous testing in the past.  Patient has been seen by Dr. Matos in [place] and had extensive testing.  Patient has also recently seen GI at [facility].  Patient did have a colonoscopy and EGD in May of this year.  This was at [facility].  Patient reports she had polyps but no other abnormalities.  Patient does have a history of C. difficile.  Patient also has fibromyalgia.  Patient reports she is even had imaging of her head in the past most likely to evaluate for multiple sclerosis.  Patient did have an anterior and posterior repair in 2018.  Patient reports she had done well until 1 year ago.  Patient reports at present her symptoms have improved as her stool is more formed.  Patient denies any recent constipation.  Patient has not been straining with defecation.  Patient denies any stress urinary incontinence.  Patient has not been using any estrogen cream.  Patient denies any stress urinary incontinence patient reports she is even been seen by immunology as her symptoms usually occur in the fall of the year.  Patient has been on allergy medication allergy injections.    History  Past Medical History:   Diagnosis Date   •  Arthritis     REPORTS BOTHERSOME IN HER NECK   • Body piercing     EARS   • Cancer (HCC)     SKIN (REMOVED FROM NOSE)   • Elevated cholesterol    • Fibromyalgia    • GERD (gastroesophageal reflux disease)    • H/O bone density study 2015   • H/O exercise stress test     REPORTS APPROXIMATELY 2014 AND THAT ALL WAS WNL'S   • H/O mammogram 06/25/2016   • Headache    • Hearing loss     REPORTS MINIMAL AND ONLY WITH CERTAIN FREQUENCIES. NO HEARING AIDS   • History of bronchitis    • History of TMJ syndrome    • Hyperlipemia    • IBS (irritable bowel syndrome)    • IBS (irritable bowel syndrome)    • Rectocele    • Seasonal allergies    • TB (tuberculosis)     REPORTS +TB SKIN TEST IN THE 80'S.  REPORTS SHE HAD TREATMENT FOR 1 YEAR BUT NO ACTIVE DISEASE.    • Wears glasses     NON RX FOR READING     Current Outpatient Medications on File Prior to Visit   Medication Sig Dispense Refill   • Ascorbic Acid (VITAMIN C GUMMIE PO) Take 1 tablet by mouth Daily.     • atorvastatin (LIPITOR) 80 MG tablet Take 80 mg by mouth Daily.     • cholecalciferol (VITAMIN D3) 1000 units tablet Take 1,000 Units by mouth Daily.     • colestipol (COLESTID) 1 g tablet Take 1 tablet by mouth 2 (Two) Times a Day. 60 tablet 2   • DULoxetine (CYMBALTA) 30 MG capsule Take 30 mg by mouth Daily.     • escitalopram (LEXAPRO) 10 MG tablet      • gabapentin (NEURONTIN) 300 MG capsule Take 300 mg by mouth As Needed.     • ibuprofen (ADVIL,MOTRIN) 200 MG tablet Take 200 mg by mouth Every Night.     • levocetirizine (XYZAL) 5 MG tablet Take 5 mg by mouth Every Evening.     • MULTIPLE VITAMIN PO Take 1 tablet by mouth Daily.     • naproxen (NAPROSYN) 500 MG tablet Take 500 mg by mouth As Needed for Mild Pain .     • omeprazole (priLOSEC) 20 MG capsule Take 20 mg by mouth Daily.     • tiZANidine (ZANAFLEX) 4 MG tablet Take 4 mg by mouth At Night As Needed for Muscle Spasms.     • vitamin B-12 (CYANOCOBALAMIN) 1000 MCG tablet Take 1,000 mcg by mouth Daily.        No current facility-administered medications on file prior to visit.     Allergies   Allergen Reactions   • Prednisone Other (See Comments)     REPORTS CAUSED VISUAL DISTURBANCE (ACUTE BLINDNESS) AND HYPOGLYCEMIA.  REPORTS SHE IS ABLE TO TAKE STEROID INJECTIONS, BUT THAT SHE IS NOT ABLE TO TOLERATE ORAL DOSES FOR PROLONGED PERIODS OF TIME.     Past Surgical History:   Procedure Laterality Date   • ANTERIOR AND POSTERIOR VAGINAL REPAIR N/A 8/28/2018    Procedure: ANTERIOR AND POSTERIOR VAGINAL REPAIR, ENTEROCELE REPAIR;  Surgeon: Teressa Gomes MD;  Location: [facility];  Service: Obstetrics/Gynecology   • BILATERAL OOPHORECTOMY     • BLADDER REPAIR  2001   • COLONOSCOPY  2011   • COLONOSCOPY  05/25/2021   • CYST REMOVAL      POSTERIOR UPPER LEFT THIGH AND LEFT SIDE OF NECK   • CYSTOSCOPY N/A 8/28/2018    Procedure: CYSTOSCOPY;  Surgeon: Teressa Gomes MD;  Location: [facility];  Service: Obstetrics/Gynecology   • ELBOW ARTHROSCOPY Right 1996   • ENDOSCOPY  5 years ago   • EYE SURGERY Bilateral     CATARACT EXTRACTIONS   • GALLBLADDER SURGERY  2006   • HYSTERECTOMY  2002   • KNEE SURGERY      REPORTS 3 SURGERIES LEFT KNEE, 1 SURGERY RIGHT KNEE   • SKIN BIOPSY      REPORTS SKIN CANCER REMOVED FROM NOSE   • TUBAL ABDOMINAL LIGATION     • WISDOM TOOTH EXTRACTION      EXTRACTIONS ON THE LEFT SIDE (TOP AND BOTTOM)     Family History   Problem Relation Age of Onset   • Ovarian cancer Mother    • Diabetes Mother    • Heart disease Mother    • Osteoporosis Sister    • Diabetes Brother    • Heart disease Brother    • Stroke Brother    • Colon polyps Brother    • Colon cancer Neg Hx      Social History     Socioeconomic History   • Marital status:    Tobacco Use   • Smoking status: Never Smoker   • Smokeless tobacco: Never Used   Vaping Use   • Vaping Use: Never used   Substance and Sexual Activity   • Alcohol use: No   • Drug use: No   • Sexual activity: Yes     Partners: Male     Birth control/protection: Surgical  "      Physical Examination:  Vital Signs: /72   Ht 160 cm (63\")   Wt 60.3 kg (133 lb)   BMI 23.56 kg/m²     General Appearance: alert, appears stated age, and cooperative  Breasts: Not performed.  Abdomen: no masses, no hepatomegaly, no splenomegaly, soft non-tender, no guarding and no rebound tenderness  Pelvic: Clinical staff was present for exam  External genitalia:  normal appearance of the external genitalia including Bartholin's and Tobias's glands.  :  urethral meatus normal;  Vaginal:  atrophic mucosal changes are present; Good apical support.  No evidence of cystocele or rectocele.  Cervix:  absent.  Uterus:  absent.  Adnexa:  non palpable bilaterally.  Rectal:  Good sphincter tone; +anal wink    Data Review:  The following data was reviewed by: Teressa Gomes MD on 12/14/2021:     Labs:    Imaging:    Medical Records:  Progress Notes by Eugene Ledbetter APRN (11/11/2021 11:00)      Assessment and Plan   Problem List Items Addressed This Visit        Gastrointestinal Abdominal     Diarrhea  Patient with intermittent episodes of diarrhea as noted.  Patient has had extensive evaluation.  Patient is to keep her follow-up appointment with GI as discussed.  Her fecal incontinence is only associated with her diarrhea.  I had a long discussion with the patient that prolapse does not affect the caliber of the stools.  Prolapse does not cause diarrhea.  There is no evidence of prolapse as noted.  Patient has been instructed her stools do need to be soft not hard and bulky.  Patient does need however to have her stools soft and not diarrhea as discussed.  Instructions and precautions have been given.  Patient voices understanding.    Incontinence of feces  Patient with fecal incontinence only with diarrhea.  There is no evidence of prolapse as discussed.  Patient is to keep her follow-up appointment with GI as noted.      Other Visit Diagnoses     Postmenopausal atrophic vaginitis    -  Primary  Discussed " various options for relief of atrophic vaginal symptoms related to menopause. Discussed local therapy for treatment of vaginal symptoms only.  Discussed the different formulation options including cream, ring, and tablets.  Discussed the low risk of systemic absorption in postmenopausal women with atrophy using 25 mcgs of estradiol on a daily basis.  Recommend low dose use 2-3x/wk for maintenance of treatment.  Other treatment options were discussed including the use of water-based and silicone-based vaginal lubricants and moisturizers.  Also discussed was the FDA approved treatment option of ospemifene for moderate to severe dyspareunia.    Urinary incontinence, unspecified type      Prescription is given for estrogen cream.  I have stressed to the patient the need to apply to the periurethral area as well.  Instructions and precautions have been given.  Patient voices understanding.          Follow Up/Instructions:  Follow up as noted.  Patient was given instructions and counseling regarding her condition or for health maintenance advice. Please see specific information pulled into the AVS if appropriate.     Note: Speech recognition transcription software may have been used to dictate portions of this document.  An attempt at proofreading has been made though minor errors in transcription may still be present.    This note was electronically signed.  Teressa Gomes M.D.

## 2022-01-12 ENCOUNTER — OFFICE VISIT (OUTPATIENT)
Dept: GASTROENTEROLOGY | Facility: CLINIC | Age: 72
End: 2022-01-12

## 2022-01-12 VITALS
BODY MASS INDEX: 24.27 KG/M2 | SYSTOLIC BLOOD PRESSURE: 118 MMHG | HEIGHT: 63 IN | DIASTOLIC BLOOD PRESSURE: 65 MMHG | WEIGHT: 137 LBS | TEMPERATURE: 97.8 F | HEART RATE: 55 BPM | RESPIRATION RATE: 16 BRPM

## 2022-01-12 DIAGNOSIS — Z12.11 ENCOUNTER FOR SCREENING FOR MALIGNANT NEOPLASM OF COLON: ICD-10-CM

## 2022-01-12 DIAGNOSIS — K21.9 GASTROESOPHAGEAL REFLUX DISEASE WITHOUT ESOPHAGITIS: Chronic | ICD-10-CM

## 2022-01-12 DIAGNOSIS — K59.1 FUNCTIONAL DIARRHEA: Primary | Chronic | ICD-10-CM

## 2022-01-12 PROCEDURE — 99214 OFFICE O/P EST MOD 30 MIN: CPT | Performed by: NURSE PRACTITIONER

## 2022-01-12 RX ORDER — CEPHALEXIN 500 MG/1
500 CAPSULE ORAL 2 TIMES DAILY
COMMUNITY
End: 2022-04-27

## 2022-01-12 RX ORDER — MONTELUKAST SODIUM 4 MG/1
1 TABLET, CHEWABLE ORAL 2 TIMES DAILY
Qty: 180 TABLET | Refills: 3 | Status: SHIPPED | OUTPATIENT
Start: 2022-01-12 | End: 2022-03-07 | Stop reason: RX

## 2022-01-12 NOTE — PROGRESS NOTES
"     Follow Up Note     Date: 2022   Patient Name: Klaudia Saez  MRN: [id number]  : 1950     Primary Care Provider: Trena Arias MD     Chief Complaint   Patient presents with   • Follow-up   • Diarrhea     History of present illness:   2022  Klaudia Saez is a 71 y.o. female who is here today for follow up for diarrhea.    She has been taking Colestid 1 gm 1 po twice a day with significant control of diarrhea. She is now having 1-3 soft bowel movements per day.  No rectal bleeding.     Interval History:  2021  She has a history of diarrhea for many years with 1-5 loose to watery bowel movements per day. She has occasional incontinence of bowels and she is not aware it has happened. She has urgency associated with bowel movements. Eating makes symptoms worse.  No abdominal pain. No rectal bleeding. She has occasional vomiting during a bowel movement, but this is unchanged.  Her last colonoscopy was about 3 months ago at [facility], she was told everything was \"normal\". Results are not available.     She has a history of severe reflux. She is taking Omeprazole 20 mg daily with reasonable control of reflux. No difficulty swallowing.      There is no family history of GI malignancy or IBD.     Subjective      Past Medical History:   Diagnosis Date   • Arthritis     REPORTS BOTHERSOME IN HER NECK   • Body piercing     EARS   • Cancer (HCC)     SKIN (REMOVED FROM NOSE)   • Elevated cholesterol    • Fibromyalgia    • GERD (gastroesophageal reflux disease)    • H/O bone density study    • H/O exercise stress test     REPORTS APPROXIMATELY 2014 AND THAT ALL WAS WNL'S   • H/O mammogram 2016   • Headache    • Hearing loss     REPORTS MINIMAL AND ONLY WITH CERTAIN FREQUENCIES. NO HEARING AIDS   • History of bronchitis    • History of TMJ syndrome    • Hyperlipemia    • IBS (irritable bowel syndrome)    • IBS (irritable bowel syndrome)    • Rectocele    • Seasonal " allergies    • TB (tuberculosis)     REPORTS +TB SKIN TEST IN THE 80'S.  REPORTS SHE HAD TREATMENT FOR 1 YEAR BUT NO ACTIVE DISEASE.    • Wears glasses     NON RX FOR READING     Past Surgical History:   Procedure Laterality Date   • ANTERIOR AND POSTERIOR VAGINAL REPAIR N/A 8/28/2018    Procedure: ANTERIOR AND POSTERIOR VAGINAL REPAIR, ENTEROCELE REPAIR;  Surgeon: Teressa Gomes MD;  Location: [facility];  Service: Obstetrics/Gynecology   • BILATERAL OOPHORECTOMY     • BLADDER REPAIR  2001   • COLONOSCOPY  2011   • COLONOSCOPY  05/25/2021   • CYST REMOVAL      POSTERIOR UPPER LEFT THIGH AND LEFT SIDE OF NECK   • CYSTOSCOPY N/A 8/28/2018    Procedure: CYSTOSCOPY;  Surgeon: Teressa Gomes MD;  Location: [facility] [place];  Service: Obstetrics/Gynecology   • ELBOW ARTHROSCOPY Right 1996   • ENDOSCOPY  5 years ago   • EYE SURGERY Bilateral     CATARACT EXTRACTIONS   • GALLBLADDER SURGERY  2006   • HYSTERECTOMY  2002   • KNEE SURGERY      REPORTS 3 SURGERIES LEFT KNEE, 1 SURGERY RIGHT KNEE   • SKIN BIOPSY      REPORTS SKIN CANCER REMOVED FROM NOSE   • TUBAL ABDOMINAL LIGATION     • WISDOM TOOTH EXTRACTION      EXTRACTIONS ON THE LEFT SIDE (TOP AND BOTTOM)     Family History   Problem Relation Age of Onset   • Ovarian cancer Mother    • Diabetes Mother    • Heart disease Mother    • Osteoporosis Sister    • Diabetes Brother    • Heart disease Brother    • Stroke Brother    • Colon polyps Brother    • Colon cancer Neg Hx      Social History     Socioeconomic History   • Marital status:    Tobacco Use   • Smoking status: Never Smoker   • Smokeless tobacco: Never Used   Vaping Use   • Vaping Use: Never used   Substance and Sexual Activity   • Alcohol use: No   • Drug use: No   • Sexual activity: Yes     Partners: Male     Birth control/protection: Surgical       Current Outpatient Medications:   •  Ascorbic Acid (VITAMIN C GUMMIE PO), Take 1 tablet by mouth Daily., Disp: , Rfl:   •  atorvastatin (LIPITOR) 80 MG tablet, Take 80  mg by mouth Daily., Disp: , Rfl:   •  cephalexin (KEFLEX) 500 MG capsule, Take 500 mg by mouth 2 (Two) Times a Day., Disp: , Rfl:   •  cholecalciferol (VITAMIN D3) 1000 units tablet, Take 1,000 Units by mouth Daily., Disp: , Rfl:   •  colestipol (COLESTID) 1 g tablet, Take 1 tablet by mouth 2 (Two) Times a Day., Disp: 180 tablet, Rfl: 3  •  DULoxetine (CYMBALTA) 30 MG capsule, Take 30 mg by mouth Daily., Disp: , Rfl:   •  escitalopram (LEXAPRO) 10 MG tablet, , Disp: , Rfl:   •  estradiol (ESTRACE VAGINAL) 0.1 MG/GM vaginal cream, Use 0.5 grams intravaginally twice weekly to control symptoms., Disp: 1 each, Rfl: 11  •  ibuprofen (ADVIL,MOTRIN) 200 MG tablet, Take 200 mg by mouth Every Night., Disp: , Rfl:   •  levocetirizine (XYZAL) 5 MG tablet, Take 5 mg by mouth Every Evening., Disp: , Rfl:   •  MULTIPLE VITAMIN PO, Take 1 tablet by mouth Daily., Disp: , Rfl:   •  naproxen (NAPROSYN) 500 MG tablet, Take 500 mg by mouth As Needed for Mild Pain ., Disp: , Rfl:   •  omeprazole (priLOSEC) 20 MG capsule, Take 20 mg by mouth Daily., Disp: , Rfl:   •  tiZANidine (ZANAFLEX) 4 MG tablet, Take 4 mg by mouth At Night As Needed for Muscle Spasms., Disp: , Rfl:   •  vitamin B-12 (CYANOCOBALAMIN) 1000 MCG tablet, Take 1,000 mcg by mouth Daily., Disp: , Rfl:      Allergies   Allergen Reactions   • Prednisone Other (See Comments)     REPORTS CAUSED VISUAL DISTURBANCE (ACUTE BLINDNESS) AND HYPOGLYCEMIA.  REPORTS SHE IS ABLE TO TAKE STEROID INJECTIONS, BUT THAT SHE IS NOT ABLE TO TOLERATE ORAL DOSES FOR PROLONGED PERIODS OF TIME.     The following portions of the patient's history were reviewed and updated as appropriate: allergies, current medications, past family history, past medical history, past social history, past surgical history and problem list.    Objective     Physical Exam  Vitals and nursing note reviewed.   Constitutional:       General: She is not in acute distress.     Appearance: Normal appearance. She is  "well-developed.   HENT:      Head: Normocephalic and atraumatic.      Right Ear: Hearing normal.      Left Ear: Hearing normal.      Mouth/Throat:      Mouth: Mucous membranes are not pale, not dry and not cyanotic.   Eyes:      General: Lids are normal.      Conjunctiva/sclera: Conjunctivae normal.   Neck:      Trachea: Trachea normal.   Cardiovascular:      Rate and Rhythm: Normal rate and regular rhythm.      Heart sounds: Normal heart sounds.   Pulmonary:      Effort: Pulmonary effort is normal. No respiratory distress.      Breath sounds: Normal breath sounds.   Abdominal:      General: Bowel sounds are normal.      Palpations: Abdomen is soft. There is no mass.      Tenderness: There is no abdominal tenderness.      Hernia: No hernia is present.   Skin:     General: Skin is warm and dry.   Neurological:      Mental Status: She is alert and oriented to person, place, and time.   Psychiatric:         Mood and Affect: Mood normal.         Speech: Speech normal.         Behavior: Behavior normal. Behavior is cooperative.       Vitals:    01/12/22 1359   BP: 118/65   Pulse: 55   Resp: 16   Temp: 97.8 °F (36.6 °C)   Weight: 62.1 kg (137 lb)   Height: 160 cm (63\")     Body mass index is 24.27 kg/m².     Results Review:   I reviewed the patient's new clinical results.    Lab on 11/11/2021   Component Date Value Ref Range Status   • IgA 11/11/2021 275  70 - 400 mg/dL Final   • WBC 11/11/2021 7.47  3.40 - 10.80 10*3/mm3 Final   • RBC 11/11/2021 4.47  3.77 - 5.28 10*6/mm3 Final   • Hemoglobin 11/11/2021 14.1  12.0 - 15.9 g/dL Final   • Hematocrit 11/11/2021 40.6  34.0 - 46.6 % Final   • MCV 11/11/2021 90.8  79.0 - 97.0 fL Final   • MCH 11/11/2021 31.5  26.6 - 33.0 pg Final   • MCHC 11/11/2021 34.7  31.5 - 35.7 g/dL Final   • RDW 11/11/2021 12.1* 12.3 - 15.4 % Final   • RDW-SD 11/11/2021 40.0  37.0 - 54.0 fl Final   • MPV 11/11/2021 10.3  6.0 - 12.0 fL Final   • Platelets 11/11/2021 340  140 - 450 10*3/mm3 Final   • " Glucose 11/11/2021 94  65 - 99 mg/dL Final   • BUN 11/11/2021 9  8 - 23 mg/dL Final   • Creatinine 11/11/2021 0.68  0.57 - 1.00 mg/dL Final   • Sodium 11/11/2021 138  136 - 145 mmol/L Final   • Potassium 11/11/2021 3.8  3.5 - 5.2 mmol/L Final   • Chloride 11/11/2021 104  98 - 107 mmol/L Final   • CO2 11/11/2021 27.1  22.0 - 29.0 mmol/L Final   • Calcium 11/11/2021 9.5  8.6 - 10.5 mg/dL Final   • Total Protein 11/11/2021 6.9  6.0 - 8.5 g/dL Final   • Albumin 11/11/2021 4.50  3.50 - 5.20 g/dL Final   • ALT (SGPT) 11/11/2021 18  1 - 33 U/L Final   • AST (SGOT) 11/11/2021 20  1 - 32 U/L Final   • Alkaline Phosphatase 11/11/2021 106  39 - 117 U/L Final   • Total Bilirubin 11/11/2021 0.7  0.0 - 1.2 mg/dL Final   • eGFR Non  Amer 11/11/2021 86  >60 mL/min/1.73 Final   • Globulin 11/11/2021 2.4  gm/dL Final   • A/G Ratio 11/11/2021 1.9  g/dL Final   • BUN/Creatinine Ratio 11/11/2021 13.2  7.0 - 25.0 Final   • Anion Gap 11/11/2021 6.9  5.0 - 15.0 mmol/L Final   • Tissue Transglutaminase IgA 11/11/2021 <2  0 - 3 U/mL Final                                  Negative        0 -  3                                Weak Positive   4 - 10                                Positive           >10   Tissue Transglutaminase (tTG) has been identified   as the endomysial antigen.  Studies have demonstr-   ated that endomysial IgA antibodies have over 99%   specificity for gluten sensitive enteropathy.   • TSH 11/11/2021 1.330  0.270 - 4.200 uIU/mL Final   • C. Difficile Toxins by PCR 11/15/2021 Not Detected  Not Detected Final   • Calprotectin, Fecal 11/15/2021 17  0 - 120 ug/g Final    Concentration     Interpretation   Follow-Up  <16 - 50 ug/g     Normal           None  >50 -120 ug/g     Borderline       Re-evaluate in 4-6 weeks      >120 ug/g     Abnormal         Repeat as clinically                                     indicated   • Pancreatic Fecal 11/15/2021 462  >200 ug Elast./g Final           Severe Pancreatic Insufficiency:           <100         Moderate Pancreatic Insufficiency:   100 - 200         Normal:                                   >200   • Salmonella/Shigella Screen 11/15/2021 Final report   Final   • Result 1 11/15/2021 Comment   Final    No Salmonella or Shigella recovered.   • Campylobacter Culture 11/15/2021 Final report   Final   • Result 1 11/15/2021 Comment   Final    No Campylobacter species isolated.   • E coli, Shiga toxin Assay 11/15/2021 Negative  Negative Final      No radiology results for the last 90 days.     EXTERNAL MEDICAL RECORDS - SCAN - COLON PROC/[facility]/2018 and 2021 (11/11/2021)    Assessment / Plan      1. Functional diarrhea  She has been taking Colestid 1 tablet by mouth twice a day and diarrhea has significantly improved.  She is now having 1-3 soft bowel movements per day depending on what she eats.  Denies rectal bleeding.  Basic labs unremarkable.  TSH normal.  Celiac panel negative.  Stool studies negative.  Colonoscopy dated 5/25/2021 per Dr. Serna unremarkable, no specimens collected.  High-fiber, low-fat diet with liberal water intake.  Colestid 1 gm 1 po twice a day. Decrease to once a day if constipated.   Imodium 2 mg 1/2-1 caplet 1-2 times per day as needed for breakthrough diarrhea.  If diarrhea worsens or develops rectal bleeding, may need colonoscopy as no biopsies were obtained during colonoscopy in 2021 to rule out IBD.    - colestipol (COLESTID) 1 g tablet; Take 1 tablet by mouth 2 (Two) Times a Day.  Dispense: 180 tablet; Refill: 3    11/11/2021  She has a history of diarrhea with incontinence of bowels for several years with 1-5 loose to watery bowel movements per day.  Eating makes symptoms worse.  No abdominal pain.  No rectal bleeding.  According to the patient, she had normal colonoscopy in May 2021 at [facility].  Unfortunately, we do not have results. No recent labs or abdominal imaging to interpret.    2. Gastroesophageal reflux disease without esophagitis  She has a  history of reflux for several years that is reasonably controlled with Omeprazole 20 mg daily. Continue same. Denies difficulty swallowing.   Anti-reflux measures.     3. Personal history of colon polyps  She had colonoscopy dated 5/25/2021 per Dr. Serna that was unremarkable, no specimens collected. She was recommended colonoscopy for screening in 3 years at that time.  She had colonoscopy in 2018 per Dr. Alvarado with polyps removed, pathology unavailable.  Colonoscopy for surveillance in 2026.    Patient Instructions   Antireflux measures: Avoid fried, fatty foods, alcohol, chocolate, coffee, tea,  soft drinks, peppermint and spearmint, spicy foods, tomatoes and tomato based foods, onions, peppers, and smoking.   Other antireflux measures include weight reduction if overweight, avoiding tight clothing around the abdomen, elevating the head of the bed 6 inches with blocks under the head board, and don't drink or eat before going to bed and avoid lying down immediately after meals.  Omeprazole 20 mg 1 by mouth in the am 30 minutes before breakfast.  Colestid 1 gm 1 po twice a day. Decrease to once per day if constipated.   May take Imodium 2 mg 1/2-1 caplet 1-2 times per day as needed for breakthrough diarrhea.  Colonoscopy for screening in 2024.  Follow up: 1 year or sooner if needed    Eugene Ledbetter, APRN  1/12/2022    Please note that portions of this note may have been completed with a voice recognition program. Efforts were made to edit the dictations, but occasionally words are mistranscribed.

## 2022-01-12 NOTE — PATIENT INSTRUCTIONS
Antireflux measures: Avoid fried, fatty foods, alcohol, chocolate, coffee, tea,  soft drinks, peppermint and spearmint, spicy foods, tomatoes and tomato based foods, onions, peppers, and smoking.   Other antireflux measures include weight reduction if overweight, avoiding tight clothing around the abdomen, elevating the head of the bed 6 inches with blocks under the head board, and don't drink or eat before going to bed and avoid lying down immediately after meals.  Omeprazole 20 mg 1 by mouth in the am 30 minutes before breakfast.  Colestid 1 gm 1 po twice a day. Decrease to once per day if constipated.   May take Imodium 2 mg 1/2-1 caplet 1-2 times per day as needed for breakthrough diarrhea.  Colonoscopy for screening in 2024.  Follow up: 1 year or sooner if needed

## 2022-01-27 DIAGNOSIS — R11.0 NAUSEA: ICD-10-CM

## 2022-01-27 DIAGNOSIS — R19.7 DIARRHEA, UNSPECIFIED TYPE: Primary | ICD-10-CM

## 2022-01-27 RX ORDER — ONDANSETRON 4 MG/1
4 TABLET, FILM COATED ORAL EVERY 8 HOURS PRN
Qty: 20 TABLET | Refills: 0 | Status: SHIPPED | OUTPATIENT
Start: 2022-01-27 | End: 2022-01-28

## 2022-01-27 RX ORDER — DICYCLOMINE HCL 20 MG
20 TABLET ORAL 3 TIMES DAILY PRN
Qty: 20 TABLET | Refills: 0 | Status: SHIPPED | OUTPATIENT
Start: 2022-01-27 | End: 2022-01-28

## 2022-01-28 ENCOUNTER — TELEPHONE (OUTPATIENT)
Dept: GASTROENTEROLOGY | Facility: CLINIC | Age: 72
End: 2022-01-28

## 2022-01-28 RX ORDER — DICYCLOMINE HCL 20 MG
20 TABLET ORAL 3 TIMES DAILY PRN
Qty: 60 TABLET | Refills: 1 | Status: SHIPPED | OUTPATIENT
Start: 2022-01-28 | End: 2022-04-27 | Stop reason: SDUPTHER

## 2022-01-28 RX ORDER — ONDANSETRON 4 MG/1
4 TABLET, FILM COATED ORAL EVERY 8 HOURS PRN
Qty: 30 TABLET | Refills: 1 | Status: SHIPPED | OUTPATIENT
Start: 2022-01-28 | End: 2022-07-20

## 2022-01-28 NOTE — TELEPHONE ENCOUNTER
----- Message from FAYE Raymundo sent at 1/27/2022  4:42 PM EST -----  If these are different symptoms than what she had in the past, she may have a viral syndrome. Continue Imodium as needed. I can send her in a few Bentyl to take as needed for lower abdominal pain and diarrhea and Zofran for the nausea, but if her symptoms are worsening, develops fever, or do not improve, she needs to go to the urgent treatment center for evaluation. If her symptoms are severe or develops rectal bleeding, go to the emergency room for evaluation.   ----- Message -----  From: Ernie Emmanuel MA  Sent: 1/27/2022   4:21 PM EST  To: FAYE Raymundo    Having abd pain, lots of diarrhea, n/v, is taking imodium, asks anything else she  can do

## 2022-01-28 NOTE — TELEPHONE ENCOUNTER
Patient called today.  Eugene out.   Spoke with Dr. Mittal re: patient complaints of diarrhea, abd pain, N/V, and BM urgency , hemorrhoids hanging out.  Per Dr. Mittal, patient can take Zofran and Bentyl (previously scripted by Eugene), which were re-sent to different pharmacy by Dr. LAZCANO.  He states patient should take Colestid BID if having diarrhea, then if still having diarrhea, she can try the Imodium (1/2 pill when needed up to BID), watch for constipation.  She can try to push the hemorrhoids very gently back in.  She is to come to the ER for any worsening symptoms or Bleeding.  Patient states understanding.

## 2022-03-04 ENCOUNTER — TELEPHONE (OUTPATIENT)
Dept: GASTROENTEROLOGY | Facility: CLINIC | Age: 72
End: 2022-03-04

## 2022-03-04 NOTE — TELEPHONE ENCOUNTER
----- Message from FAYE Raymundo sent at 3/3/2022  1:26 PM EST -----  I can send in the Cholestyramine powder, she would need to mix it in water or juice. I've been told it has a gritty texture, so some people don't like to take it, but she can try it if she wants. Or she can take Imodium as needed.  ----- Message -----  From: Lisa Rodriguez MA  Sent: 3/3/2022   1:22 PM EST  To: FAYE Raymundo    Patient cannot get the Colestid due to a nationwide shortage.  Is there anything else?

## 2022-03-07 ENCOUNTER — TELEPHONE (OUTPATIENT)
Dept: GASTROENTEROLOGY | Facility: CLINIC | Age: 72
End: 2022-03-07

## 2022-03-07 DIAGNOSIS — K59.1 FUNCTIONAL DIARRHEA: Primary | ICD-10-CM

## 2022-03-07 DIAGNOSIS — R19.7 DIARRHEA, UNSPECIFIED TYPE: ICD-10-CM

## 2022-03-07 NOTE — TELEPHONE ENCOUNTER
Patient informed, will  the cholestyramine powder and try it, she is aware, she may have to try and adjust the dose to help if there is constipation issues.

## 2022-03-29 ENCOUNTER — TELEPHONE (OUTPATIENT)
Dept: GASTROENTEROLOGY | Facility: CLINIC | Age: 72
End: 2022-03-29

## 2022-03-29 DIAGNOSIS — R19.7 ACUTE DIARRHEA: ICD-10-CM

## 2022-03-29 DIAGNOSIS — R19.7 DIARRHEA, UNSPECIFIED TYPE: Primary | ICD-10-CM

## 2022-03-29 NOTE — TELEPHONE ENCOUNTER
Pt called in stating that she is having severe episodes of diarrhea, up to 5xs per day. Pt stated that the Colestid 20mg and the cholestyramine light 4gr is not working anymore. Pt stated that it is very watery. Pt denies any fever, chills, abd pain, nausea and vomiting. Pt also denied any blood in her stool. Pt stated that she is able to eat and drink okay, but it makes her have BM's almost directly after. Please advise, thank you.

## 2022-03-29 NOTE — TELEPHONE ENCOUNTER
Contacted pt to let her know that you have put in some stool studies since her diarrhea worsened. I let pt know that she can go by the hospital, and they will give her the kit to complete these. I let her know that if her stool studies are negative, we will increase her Colestid to 3 times per day. Pt verbalized understanding, and stated that she will try to take care of this tomorrow.

## 2022-03-29 NOTE — TELEPHONE ENCOUNTER
Let her know that I put in stool studies since the diarrhea worsened. If she can go by the hospital, they will give her the kit. If stool studies are negative, we can increase her Colestid to 3 times per day.

## 2022-04-02 DIAGNOSIS — K59.1 FUNCTIONAL DIARRHEA: ICD-10-CM

## 2022-04-04 RX ORDER — MONTELUKAST SODIUM 4 MG/1
1 TABLET, CHEWABLE ORAL 3 TIMES DAILY
Qty: 90 TABLET | Refills: 1 | Status: SHIPPED | OUTPATIENT
Start: 2022-04-04 | End: 2022-04-27

## 2022-04-05 LAB — C DIFF TOX GENS STL QL NAA+PROBE: NOT DETECTED

## 2022-04-05 PROCEDURE — 87045 FECES CULTURE AEROBIC BACT: CPT | Performed by: NURSE PRACTITIONER

## 2022-04-05 PROCEDURE — 87046 STOOL CULTR AEROBIC BACT EA: CPT | Performed by: NURSE PRACTITIONER

## 2022-04-05 PROCEDURE — 87427 SHIGA-LIKE TOXIN AG IA: CPT | Performed by: NURSE PRACTITIONER

## 2022-04-05 PROCEDURE — 87493 C DIFF AMPLIFIED PROBE: CPT | Performed by: NURSE PRACTITIONER

## 2022-04-25 ENCOUNTER — TELEPHONE (OUTPATIENT)
Dept: GASTROENTEROLOGY | Facility: CLINIC | Age: 72
End: 2022-04-25

## 2022-04-25 NOTE — TELEPHONE ENCOUNTER
As you discussed with Kelton and Bev this morning, Please advise, thank you.               ----- Message from Ambrocio Barber sent at 4/25/2022  9:27 AM EDT -----  The patient left a voicemail asking for a call back.  She states she is still having diarrhea.  Please return her call.  Thank you.

## 2022-04-25 NOTE — TELEPHONE ENCOUNTER
Can you call her and see if she is taking the Colestipol tablet or the Cholestyramine powder and find out how often she is taking it. I know she was not able to get the Colestipol tablet for a while due to shortage and she was switched to cholestyramine. She can take it 3 times per day, she can also take Imodium 2 mg 1/2-1 tablet 2-3 times per day as needed for diarrhea.  She may need colonoscopy to get biopsies as Dr. Serna did not do  biopsies during her colonoscopy in 2021 to rule out inflammatory bowel disease or microscopic colitis. She has appointment with Dr. Mittal on Wednesday and he will discuss further.

## 2022-04-25 NOTE — TELEPHONE ENCOUNTER
Tried to contact ptSaulo MOON. I will discuss if she returns call. If not appt with Dr Mittal on Wed. 4/27/2022

## 2022-04-27 ENCOUNTER — OFFICE VISIT (OUTPATIENT)
Dept: GASTROENTEROLOGY | Facility: CLINIC | Age: 72
End: 2022-04-27

## 2022-04-27 VITALS
WEIGHT: 128 LBS | HEIGHT: 63 IN | DIASTOLIC BLOOD PRESSURE: 62 MMHG | SYSTOLIC BLOOD PRESSURE: 110 MMHG | TEMPERATURE: 97.5 F | BODY MASS INDEX: 22.68 KG/M2

## 2022-04-27 DIAGNOSIS — K21.9 GASTROESOPHAGEAL REFLUX DISEASE WITHOUT ESOPHAGITIS: ICD-10-CM

## 2022-04-27 DIAGNOSIS — R10.84 GENERALIZED ABDOMINAL PAIN: ICD-10-CM

## 2022-04-27 DIAGNOSIS — K58.0 IRRITABLE BOWEL SYNDROME WITH DIARRHEA: ICD-10-CM

## 2022-04-27 DIAGNOSIS — R19.7 DIARRHEA, UNSPECIFIED TYPE: Primary | ICD-10-CM

## 2022-04-27 PROCEDURE — 99214 OFFICE O/P EST MOD 30 MIN: CPT | Performed by: INTERNAL MEDICINE

## 2022-04-27 RX ORDER — BISACODYL 5 MG/1
20 TABLET, DELAYED RELEASE ORAL ONCE
Qty: 4 TABLET | Refills: 0 | Status: SHIPPED | OUTPATIENT
Start: 2022-04-27 | End: 2022-04-27

## 2022-04-27 RX ORDER — DICYCLOMINE HCL 20 MG
20 TABLET ORAL 2 TIMES DAILY PRN
Qty: 60 TABLET | Refills: 2 | Status: SHIPPED | OUTPATIENT
Start: 2022-04-27

## 2022-04-27 RX ORDER — MELOXICAM 7.5 MG/1
7.5 TABLET ORAL AS NEEDED
COMMUNITY
Start: 2022-02-15 | End: 2022-07-20

## 2022-04-27 RX ORDER — CHOLESTYRAMINE 4 G/9G
2 POWDER, FOR SUSPENSION ORAL 2 TIMES DAILY WITH MEALS
COMMUNITY
Start: 2022-03-07 | End: 2022-04-27

## 2022-04-27 RX ORDER — FAMOTIDINE 20 MG/1
20 TABLET, FILM COATED ORAL 2 TIMES DAILY
Qty: 60 TABLET | Refills: 5 | Status: SHIPPED | OUTPATIENT
Start: 2022-04-27 | End: 2022-06-14 | Stop reason: HOSPADM

## 2022-04-27 RX ORDER — SODIUM CHLORIDE 9 MG/ML
70 INJECTION, SOLUTION INTRAVENOUS CONTINUOUS PRN
Status: CANCELLED | OUTPATIENT
Start: 2022-04-27

## 2022-04-27 NOTE — PROGRESS NOTES
"     Follow Up Note     Date: 2022   Patient Name: Klaudia Saez  MRN: [id number]  : 1950     Referring Physician: Trena Arias MD    Chief Complaint:    Chief Complaint   Patient presents with   • Follow-up   • Functional diarrhea       Interval History:   2022  Klaudia Saez is a 71 y.o. female who is here today for follow up of her diarrhea.  She states that colestipol and cholestyramine both did not help her.  Cholestyramine caused her significant nausea.  She has been having significant watery stools with fecal urgency now with multiple bowel movements anywhere 4-5 times per day with mid and lower abdominal pain.  No blood in the stool or melena.    2021  She has a history of diarrhea for many years with 1-5 loose to watery bowel movements per day. She has occasional incontinence of bowels and she is not aware it has happened. She has urgency associated with bowel movements. Eating makes symptoms worse.  No abdominal pain. No rectal bleeding. She has occasional vomiting during a bowel movement, but this is unchanged.  Her last colonoscopy was about 3 months ago at [facility], she was told everything was \"normal\". Results are not available.     She has a history of severe reflux. She is taking Omeprazole 20 mg daily with reasonable control of reflux. No difficulty swallowing.   There is no family history of GI malignancy or IBD.     Subjective      Past Medical History:   Past Medical History:   Diagnosis Date   • Arthritis     REPORTS BOTHERSOME IN HER NECK   • Body piercing     EARS   • Cancer (HCC)     SKIN (REMOVED FROM NOSE)   • Elevated cholesterol    • Fibromyalgia    • Functional diarrhea    • GERD (gastroesophageal reflux disease)    • H/O bone density study    • H/O exercise stress test     REPORTS APPROXIMATELY  AND THAT ALL WAS WNL'S   • H/O mammogram 2016   • Headache    • Hearing loss     REPORTS MINIMAL AND ONLY WITH CERTAIN " FREQUENCIES. NO HEARING AIDS   • History of bronchitis    • History of TMJ syndrome    • Hyperlipemia    • IBS (irritable bowel syndrome)    • IBS (irritable bowel syndrome)    • Rectocele    • Seasonal allergies    • TB (tuberculosis)     REPORTS +TB SKIN TEST IN THE 80'S.  REPORTS SHE HAD TREATMENT FOR 1 YEAR BUT NO ACTIVE DISEASE.    • Wears glasses     NON RX FOR READING     Past Surgical History:   Past Surgical History:   Procedure Laterality Date   • ANTERIOR AND POSTERIOR VAGINAL REPAIR N/A 08/28/2018    Procedure: ANTERIOR AND POSTERIOR VAGINAL REPAIR, ENTEROCELE REPAIR;  Surgeon: Teressa Gomes MD;  Location: [facility];  Service: Obstetrics/Gynecology   • BILATERAL OOPHORECTOMY     • BLADDER REPAIR  2001   • COLONOSCOPY  2011   • COLONOSCOPY  05/25/2021   • CYST REMOVAL      POSTERIOR UPPER LEFT THIGH AND LEFT SIDE OF NECK   • CYSTOSCOPY N/A 08/28/2018    Procedure: CYSTOSCOPY;  Surgeon: Teressa Gomes MD;  Location: [facility];  Service: Obstetrics/Gynecology   • ELBOW ARTHROSCOPY Right 1996   • ENDOSCOPY  5 years ago   • EYE SURGERY Bilateral     CATARACT EXTRACTIONS   • GALLBLADDER SURGERY  2006   • HYSTERECTOMY  2002   • KNEE SURGERY      REPORTS 3 SURGERIES LEFT KNEE, 1 SURGERY RIGHT KNEE   • MOUTH SURGERY  04/2022   • SKIN BIOPSY      REPORTS SKIN CANCER REMOVED FROM NOSE   • TUBAL ABDOMINAL LIGATION     • WISDOM TOOTH EXTRACTION      EXTRACTIONS ON THE LEFT SIDE (TOP AND BOTTOM)       Family History:   Family History   Problem Relation Age of Onset   • Ovarian cancer Mother    • Diabetes Mother    • Heart disease Mother    • Osteoporosis Sister    • Diabetes Brother    • Heart disease Brother    • Stroke Brother    • Colon polyps Brother    • Colon cancer Neg Hx    • Rectal cancer Neg Hx    • Stomach cancer Neg Hx    • Liver cancer Neg Hx        Social History:   Social History     Socioeconomic History   • Marital status:    Tobacco Use   • Smoking status: Never Smoker   • Smokeless tobacco:  Never Used   Vaping Use   • Vaping Use: Never used   Substance and Sexual Activity   • Alcohol use: No   • Drug use: Yes     Types: Marijuana   • Sexual activity: Yes     Partners: Male     Birth control/protection: Surgical       Medications:     Current Outpatient Medications:   •  Ascorbic Acid (VITAMIN C GUMMIE PO), Take 1 tablet by mouth Daily., Disp: , Rfl:   •  atorvastatin (LIPITOR) 80 MG tablet, Take 80 mg by mouth Daily., Disp: , Rfl:   •  cholecalciferol (VITAMIN D3) 1000 units tablet, Take 1,000 Units by mouth Daily., Disp: , Rfl:   •  dicyclomine (BENTYL) 20 MG tablet, Take 1 tablet by mouth 3 (Three) Times a Day As Needed (Abdominal pain)., Disp: 60 tablet, Rfl: 1  •  DULoxetine (CYMBALTA) 30 MG capsule, Take 30 mg by mouth Daily., Disp: , Rfl:   •  escitalopram (LEXAPRO) 10 MG tablet, , Disp: , Rfl:   •  estradiol (ESTRACE VAGINAL) 0.1 MG/GM vaginal cream, Use 0.5 grams intravaginally twice weekly to control symptoms., Disp: 1 each, Rfl: 11  •  ibuprofen (ADVIL,MOTRIN) 200 MG tablet, Take 200 mg by mouth Every Night., Disp: , Rfl:   •  levocetirizine (XYZAL) 5 MG tablet, Take 5 mg by mouth Every Evening., Disp: , Rfl:   •  meloxicam (MOBIC) 7.5 MG tablet, Take 7.5 mg by mouth As Needed., Disp: , Rfl:   •  MULTIPLE VITAMIN PO, Take 1 tablet by mouth Daily., Disp: , Rfl:   •  naproxen (NAPROSYN) 500 MG tablet, Take 500 mg by mouth As Needed for Mild Pain ., Disp: , Rfl:   •  omeprazole (priLOSEC) 20 MG capsule, Take 20 mg by mouth Daily., Disp: , Rfl:   •  tiZANidine (ZANAFLEX) 4 MG tablet, Take 4 mg by mouth At Night As Needed for Muscle Spasms., Disp: , Rfl:   •  vitamin B-12 (CYANOCOBALAMIN) 1000 MCG tablet, Take 1,000 mcg by mouth Daily., Disp: , Rfl:   •  cholestyramine (QUESTRAN) 4 g packet, Take 2 packets by mouth 2 (Two) Times a Day With Meals., Disp: , Rfl:   •  colestipol (COLESTID) 1 g tablet, Take 1 tablet by mouth 3 (Three) Times a Day., Disp: 90 tablet, Rfl: 1  •  ondansetron (Zofran) 4 MG  "tablet, Take 1 tablet by mouth Every 8 (Eight) Hours As Needed for Nausea or Vomiting (Nausea)., Disp: 30 tablet, Rfl: 1    Allergies:   Allergies   Allergen Reactions   • Prednisone Other (See Comments)     REPORTS CAUSED VISUAL DISTURBANCE (ACUTE BLINDNESS) AND HYPOGLYCEMIA.  REPORTS SHE IS ABLE TO TAKE STEROID INJECTIONS, BUT THAT SHE IS NOT ABLE TO TOLERATE ORAL DOSES FOR PROLONGED PERIODS OF TIME.       Review of Systems:   Review of Systems   Constitutional: Positive for appetite change. Negative for fatigue, fever and unexpected weight loss.   HENT: Negative for trouble swallowing.    Gastrointestinal: Positive for abdominal pain, diarrhea and nausea. Negative for abdominal distention, anal bleeding, blood in stool, constipation, rectal pain, vomiting, GERD and indigestion.       The following portions of the patient's history were reviewed and updated as appropriate: allergies, current medications, past family history, past medical history, past social history, past surgical history and problem list.    Objective     Physical Exam:  Vital Signs:   Vitals:    04/27/22 1601   BP: 110/62   Temp: 97.5 °F (36.4 °C)   Weight: 58.1 kg (128 lb)   Height: 160 cm (63\")       Physical Exam  Constitutional:       Appearance: Normal appearance.   HENT:      Head: Normocephalic and atraumatic.   Eyes:      Conjunctiva/sclera: Conjunctivae normal.   Abdominal:      General: Abdomen is flat. There is no distension.      Palpations: There is no mass.      Tenderness: There is abdominal tenderness (Minimal discomfort in the upper abdomen and lower abdomen). There is no guarding or rebound.      Hernia: No hernia is present.   Musculoskeletal:      Cervical back: Normal range of motion and neck supple.   Neurological:      Mental Status: She is alert.         Results Review:   I reviewed the patient's new clinical results.    Telephone on 03/29/2022   Component Date Value Ref Range Status   • C. Difficile Toxins by PCR " 04/05/2022 Not Detected  Not Detected Final   Orders Only on 03/07/2022   Component Date Value Ref Range Status   • Salmonella/Shigella Screen 04/05/2022 Final report   Final   • Result 1 04/05/2022 Comment   Final    No Salmonella or Shigella recovered.   • Campylobacter Culture 04/05/2022 Final report   Final   • Result 1 04/05/2022 Comment   Final    No Campylobacter species isolated.   • E coli, Shiga toxin Assay 04/05/2022 Negative  Negative Final      No radiology results for the last 90 days.    Assessment / Plan      1.  Suspected IBS with diarrhea  To rule out microscopic colitis  4/27/2022  Patient has a longstanding history of loose stools, diffuse abdominal pain, nausea.  She had a gallbladder removed many years ago for the same symptoms.  Patient also has a fibromyalgia.  She is also smoking marijuana regularly which is possibly aggravating her abdominal symptoms.     She is also on long-term NSAIDs intermittently including meloxicam, ibuprofen, Naprosyn.  She is also on a Prilosec for many years.  All these medications are considered one of the etiology for microscopic colitis that needs to be ruled out.  Unfortunately she had a colonoscopy done in May 2021 at [facility] and no colon or small bowel biopsies obtained.    Her lab work done in November 2021 including CBC CMP TSH were normal.  Her celiac serology was negative.  Fecal calprotectin and fecal elastase was normal.  Recent stool studies including stool C. difficile and GI panel negative.    We had a detailed discussion regarding the treatment of IBS however still microscopic colitis need to be ruled out and agreed on proceeding with a colonoscopy and biopsy    We are going to discontinue her Prilosec and advised to restrict her NSAID use to avoid development of microscopic colitis  We also discussed on cutting down on the marijuana use which can adversely affect on her GI symptoms.    Dietary changes emphasized as the major treatment in  most patients with the IBS.  Advised trial of avoiding cheese dairy  food lactose, wheat, red meat for 2 weeks and reintroduce depending on symptom relief.    We will start her on a Pepcid p.o. twice daily for reflux symptoms  Dicyclomine 20 mg p.o. twice daily as needed.  As per patient Colestid mild, colestipol did not help her.  Advised to take Imodium half tablets p.o. twice daily for 1 to 2 weeks and if she still has a bowel movement more than 3/day advised to increase the dose to 1 tablets p.o. twice daily.  Avoid if there is any constipation  I have also prescribed Mahana CBT treatment.  Details given including the kim details  Will consider CT abdomen pelvis in the near future if her symptoms does not improve    11/11/2021  She has a history of diarrhea with incontinence of bowels for several years with 1-5 loose to watery bowel movements per day.  Eating makes symptoms worse.  No abdominal pain.  No rectal bleeding.  According to the patient, she had normal colonoscopy in May 2021 at [facility].  Unfortunately, we do not have results. No recent labs or abdominal imaging to interpret.     2. Gastroesophageal reflux disease without esophagitis  Given her high risk for microscopic colitis her Prilosec has been discontinued and started on Pepcid 20 mg p.o. twice daily.  She does not have any significant reflux symptoms now.     Prior history  3. Personal history of colon polyps  She had colonoscopy dated 5/25/2021 per Dr. Serna that was unremarkable, no specimens collected. She was recommended colonoscopy for screening in 3 years at that time.  Colonoscopy in 2018 polyps removed however pathology was hyperplastic..       Follow Up:   No follow-ups on file.    Mario Alberto Mittal MD  [facility]  4/27/2022  16:10 EDT     Please note that portions of this note may have been completed with a voice recognition program.

## 2022-06-10 RX ORDER — OMEPRAZOLE 20 MG/1
20 CAPSULE, DELAYED RELEASE ORAL DAILY
COMMUNITY

## 2022-06-10 NOTE — PRE-PROCEDURE INSTRUCTIONS
PAT phone history completed with pt for upcoming procedure on 6/14/22, with Dr. Mittal.    PAT PASS GIVEN/REVIEWED WITH PT.  VERBALIZED UNDERSTANDING OF THE FOLLOWING:  DO NOT EAT, DRINK, SMOKE, USE SMOKELESS TOBACCO OR CHEW GUM AFTER MIDNIGHT THE NIGHT BEFORE SURGERY.  THIS ALSO INCLUDES HARD CANDIES AND MINTS.    DO NOT SHAVE THE AREA TO BE OPERATED ON AT LEAST 48 HOURS PRIOR TO THE PROCEDURE.  DO NOT WEAR MAKE UP OR NAIL POLISH.  DO NOT LEAVE IN ANY PIERCING OR WEAR JEWELRY THE DAY OF SURGERY.      DO NOT USE ADHESIVES IF YOU WEAR DENTURES.    DO NOT WEAR EYE CONTACTS; BRING IN YOUR GLASSES.    ONLY TAKE MEDICATION THE MORNING OF YOUR PROCEDURE IF INSTRUCTED BY YOUR SURGEON WITH ENOUGH WATER TO SWALLOW THE MEDICATION.  IF YOUR SURGEON DID NOT SPECIFY WHICH MEDICATIONS TO TAKE, YOU WILL NEED TO CALL THEIR OFFICE FOR FURTHER INSTRUCTIONS AND DO AS THEY INSTRUCT.    LEAVE ANYTHING YOU CONSIDER VALUABLE AT HOME.    YOU WILL NEED TO ARRANGE FOR SOMEONE TO DRIVE YOU HOME AFTER SURGERY.  IT IS RECOMMENDED THAT YOU DO NOT DRIVE, WORK, DRINK ALCOHOL OR MAKE MAJOR DECISIONS FOR AT LEAST 24 HOURS AFTER YOUR PROCEDURE IS COMPLETE.      THE DAY OF YOUR PROCEDURE, BRING IN THE FOLLOWING IF APPLICABLE:   PICTURE ID AND INSURANCE/MEDICARE OR MEDICAID CARDS/ANY CO-PAY THAT MAY BE DUE   COPY OF ADVANCED DIRECTIVE/LIVING WILL/POWER OR    CPAP/BIPAP/INHALERS   SKIN PREP SHEET   YOUR PREADMISSION TESTING PASS (IF NOT A PHONE HISTORY)           COVID self-quarantine instructions reviewed with the pt.  Verbalized understanding.

## 2022-06-14 ENCOUNTER — HOSPITAL ENCOUNTER (OUTPATIENT)
Facility: HOSPITAL | Age: 72
Setting detail: HOSPITAL OUTPATIENT SURGERY
Discharge: HOME | End: 2022-06-14
Attending: INTERNAL MEDICINE | Admitting: INTERNAL MEDICINE

## 2022-06-14 ENCOUNTER — ANESTHESIA EVENT (OUTPATIENT)
Dept: GASTROENTEROLOGY | Facility: HOSPITAL | Age: 72
End: 2022-06-14

## 2022-06-14 ENCOUNTER — ANESTHESIA (OUTPATIENT)
Dept: GASTROENTEROLOGY | Facility: HOSPITAL | Age: 72
End: 2022-06-14

## 2022-06-14 VITALS
HEART RATE: 62 BPM | TEMPERATURE: 97.6 F | BODY MASS INDEX: 21.79 KG/M2 | SYSTOLIC BLOOD PRESSURE: 113 MMHG | DIASTOLIC BLOOD PRESSURE: 71 MMHG | RESPIRATION RATE: 18 BRPM | OXYGEN SATURATION: 98 % | WEIGHT: 123 LBS | HEIGHT: 63 IN

## 2022-06-14 DIAGNOSIS — R19.7 DIARRHEA, UNSPECIFIED TYPE: ICD-10-CM

## 2022-06-14 PROCEDURE — 88305 TISSUE EXAM BY PATHOLOGIST: CPT

## 2022-06-14 PROCEDURE — 25010000002 PROPOFOL 10 MG/ML EMULSION: Performed by: NURSE ANESTHETIST, CERTIFIED REGISTERED

## 2022-06-14 PROCEDURE — 45380 COLONOSCOPY AND BIOPSY: CPT | Performed by: INTERNAL MEDICINE

## 2022-06-14 RX ORDER — PROPOFOL 10 MG/ML
VIAL (ML) INTRAVENOUS AS NEEDED
Status: DISCONTINUED | OUTPATIENT
Start: 2022-06-14 | End: 2022-06-14 | Stop reason: SURG

## 2022-06-14 RX ORDER — LIDOCAINE HYDROCHLORIDE 20 MG/ML
INJECTION, SOLUTION INTRAVENOUS AS NEEDED
Status: DISCONTINUED | OUTPATIENT
Start: 2022-06-14 | End: 2022-06-14 | Stop reason: SURG

## 2022-06-14 RX ORDER — SIMETHICONE 20 MG/.3ML
EMULSION ORAL AS NEEDED
Status: DISCONTINUED | OUTPATIENT
Start: 2022-06-14 | End: 2022-06-14 | Stop reason: HOSPADM

## 2022-06-14 RX ORDER — SODIUM CHLORIDE 9 MG/ML
70 INJECTION, SOLUTION INTRAVENOUS CONTINUOUS PRN
Status: DISCONTINUED | OUTPATIENT
Start: 2022-06-14 | End: 2022-06-14 | Stop reason: HOSPADM

## 2022-06-14 RX ADMIN — LIDOCAINE HYDROCHLORIDE 60 MG: 20 INJECTION, SOLUTION INTRAVENOUS at 12:44

## 2022-06-14 RX ADMIN — PROPOFOL 180 MCG/KG/MIN: 10 INJECTION, EMULSION INTRAVENOUS at 12:44

## 2022-06-14 RX ADMIN — PROPOFOL 50 MG: 10 INJECTION, EMULSION INTRAVENOUS at 12:44

## 2022-06-14 RX ADMIN — SODIUM CHLORIDE 70 ML/HR: 9 INJECTION, SOLUTION INTRAVENOUS at 11:02

## 2022-06-14 NOTE — ANESTHESIA POSTPROCEDURE EVALUATION
Patient: Klaudia Saez    Procedure Summary     Date: 06/14/22 Room / Location: [facility] ENDOSCOPY 2 / [facility] ENDOSCOPY    Anesthesia Start: 1241 Anesthesia Stop: 1310    Procedure: COLONOSCOPY WITH BIOPSY (N/A Anus) Diagnosis:       Diarrhea, unspecified type      (Diarrhea, unspecified type [R19.7])    Surgeons: Mario Alberto Mittal MD Provider: Ishaan Berry CRNA    Anesthesia Type: MAC ASA Status: 2          Anesthesia Type: MAC    Vitals  No vitals data found for the desired time range.          Post Anesthesia Care and Evaluation    Patient location during evaluation: PHASE II  Patient participation: complete - patient participated  Level of consciousness: awake and alert  Pain score: 0  Pain management: satisfactory to patient    Airway patency: patent  Anesthetic complications: No anesthetic complications  PONV Status: none  Cardiovascular status: acceptable and stable  Respiratory status: acceptable  Hydration status: acceptable    Comments: Vitals signs as noted in nursing documentation as per protocol.

## 2022-06-14 NOTE — H&P
[facility]  HISTORY AND PHYSICAL    Patient Name: Klaudia Saez  : 1950  MRN: [id number]    Chief Complaint:   For colonoscopy    History Of Presenting Illness:    Change in bowel habit  Chronic diarrhera    Past Medical History:   Diagnosis Date   • Arthritis     REPORTS BOTHERSOME IN HER NECK   • Body piercing     EARS   • Cancer (HCC)     SKIN (REMOVED FROM NOSE)   • Elevated cholesterol    • Fibromyalgia    • Functional diarrhea    • GERD (gastroesophageal reflux disease)    • H/O bone density study    • H/O exercise stress test     REPORTS APPROXIMATELY 2014 AND THAT ALL WAS WNL'S   • H/O mammogram 2016   • Headache    • Hearing loss     REPORTS MINIMAL AND ONLY WITH CERTAIN FREQUENCIES. NO HEARING AIDS   • History of bronchitis    • History of TMJ syndrome    • Hyperlipemia    • IBS (irritable bowel syndrome)    • Rectocele    • Seasonal allergies    • TB (tuberculosis)     REPORTS +TB SKIN TEST IN THE S.  REPORTS SHE HAD TREATMENT FOR 1 YEAR BUT NO ACTIVE DISEASE.    • Wears glasses     NON RX FOR READING       Past Surgical History:   Procedure Laterality Date   • ANTERIOR AND POSTERIOR VAGINAL REPAIR N/A 2018    Procedure: ANTERIOR AND POSTERIOR VAGINAL REPAIR, ENTEROCELE REPAIR;  Surgeon: Teressa Gomes MD;  Location: [facility];  Service: Obstetrics/Gynecology   • BILATERAL OOPHORECTOMY     • BLADDER REPAIR     • CHOLECYSTECTOMY     • COLONOSCOPY     • COLONOSCOPY  2021   • CYST REMOVAL      POSTERIOR UPPER LEFT THIGH AND LEFT SIDE OF NECK   • CYSTOSCOPY N/A 2018    Procedure: CYSTOSCOPY;  Surgeon: Teressa Gomes MD;  Location: [facility];  Service: Obstetrics/Gynecology   • ELBOW ARTHROSCOPY Right    • ENDOSCOPY  5 years ago   • EYE SURGERY Bilateral     CATARACT EXTRACTIONS   • GALLBLADDER SURGERY     • HYSTERECTOMY     • KNEE SURGERY      REPORTS 3 SURGERIES LEFT KNEE, 1 SURGERY RIGHT KNEE   • MOUTH SURGERY  2022   •  SKIN BIOPSY      REPORTS SKIN CANCER REMOVED FROM NOSE   • TUBAL ABDOMINAL LIGATION     • UPPER GASTROINTESTINAL ENDOSCOPY  2007   • WISDOM TOOTH EXTRACTION      EXTRACTIONS ON THE LEFT SIDE (TOP AND BOTTOM)       Social History     Socioeconomic History   • Marital status:    Tobacco Use   • Smoking status: Never Smoker   • Smokeless tobacco: Never Used   Vaping Use   • Vaping Use: Never used   Substance and Sexual Activity   • Alcohol use: No   • Drug use: Yes     Types: Marijuana     Comment: Under Dr. Vargas   • Sexual activity: Defer       Family History   Problem Relation Age of Onset   • Ovarian cancer Mother    • Diabetes Mother    • Heart disease Mother    • Osteoporosis Sister    • Diabetes Brother    • Heart disease Brother    • Stroke Brother    • Colon polyps Brother    • Colon cancer Neg Hx    • Rectal cancer Neg Hx    • Stomach cancer Neg Hx    • Liver cancer Neg Hx        Prior to Admission Medications:  Medications Prior to Admission   Medication Sig Dispense Refill Last Dose   • Ascorbic Acid (VITAMIN C GUMMIE PO) Take 1 tablet by mouth Daily.   6/13/2022 at 0900   • atorvastatin (LIPITOR) 80 MG tablet Take 80 mg by mouth Daily.   6/13/2022 at 900   • cholecalciferol (VITAMIN D3) 1000 units tablet Take 1,000 Units by mouth Daily.   6/13/2022 at 900   • dicyclomine (BENTYL) 20 MG tablet Take 1 tablet by mouth 2 (Two) Times a Day As Needed (Abdominal pain). 60 tablet 2    • DULoxetine (CYMBALTA) 30 MG capsule Take 30 mg by mouth Daily.   6/13/2022 at 0900   • escitalopram (LEXAPRO) 10 MG tablet Take 10 mg by mouth Daily.   6/13/2022 at 900   • estradiol (ESTRACE VAGINAL) 0.1 MG/GM vaginal cream Use 0.5 grams intravaginally twice weekly to control symptoms. 1 each 11 Past Week at Unknown time   • ibuprofen (ADVIL,MOTRIN) 200 MG tablet Take 200 mg by mouth At Night As Needed.   Past Month at Unknown time   • levocetirizine (XYZAL) 5 MG tablet Take 5 mg by mouth Every Evening.   6/13/2022 at 1700    • magnesium citrate solution Dispense three bottles for colonoscopy prep. Please see prep instructions from office. 888 mL 0 6/13/2022 at 1800   • meloxicam (MOBIC) 7.5 MG tablet Take 7.5 mg by mouth As Needed.   6/13/2022 at 0900   • MULTIPLE VITAMIN PO Take 1 tablet by mouth Daily.   6/13/2022 at 900   • omeprazole (priLOSEC) 20 MG capsule Take 20 mg by mouth Daily.   6/13/2022 at 900   • ondansetron (Zofran) 4 MG tablet Take 1 tablet by mouth Every 8 (Eight) Hours As Needed for Nausea or Vomiting (Nausea). 30 tablet 1 Past Month at Unknown time   • tiZANidine (ZANAFLEX) 4 MG tablet Take 4 mg by mouth At Night As Needed for Muscle Spasms.   Past Week at Unknown time   • vitamin B-12 (CYANOCOBALAMIN) 1000 MCG tablet Take 1,000 mcg by mouth Daily.   6/13/2022 at 0900   • famotidine (Pepcid) 20 MG tablet Take 1 tablet by mouth 2 (Two) Times a Day. 60 tablet 5    • naproxen (NAPROSYN) 500 MG tablet Take 500 mg by mouth As Needed for Mild Pain .          Allergies:  Allergies   Allergen Reactions   • Prednisone Other (See Comments)     REPORTS CAUSED VISUAL DISTURBANCE (ACUTE BLINDNESS) AND HYPOGLYCEMIA.  REPORTS SHE IS ABLE TO TAKE STEROID INJECTIONS, BUT THAT SHE IS NOT ABLE TO TOLERATE ORAL DOSES FOR PROLONGED PERIODS OF TIME.   • Codeine Unknown - Low Severity        Vitals: Temp:  [97.6 °F (36.4 °C)] 97.6 °F (36.4 °C)  Heart Rate:  [68] 68  Resp:  [16] 16  BP: (127)/(84) 127/84    Review Of Systems:  Constitutional:  Negative for chills, fever, and unexpected weight change.  Respiratory:  Negative for cough, chest tightness, shortness of breath, and wheezing.  Cardiovascular:  Negative for chest pain, palpitations, and leg swelling.  Gastrointestinal:  Negative for abdominal distention, abdominal pain, Nausea, vomiting.  Neurological:  Negative for Weakness, numbness, and headaches.     Physical Exam:    General Appearance:  Alert, cooperative, in no acute distress.   Lungs:   Clear to auscultation, respirations  regular, even and                 unlabored.   Heart:  Regular rhythm and normal rate.   Abdomen:   Normal bowel sounds, no masses, no organomegaly. Soft, non-tender, non-distended   Neurologic: Alert and oriented x 3. Moves all four limbs equally       Plan: COLONOSCOPY (N/A)     Mario Alberto Mittal MD  6/14/2022

## 2022-06-14 NOTE — ANESTHESIA PREPROCEDURE EVALUATION
Anesthesia Evaluation     Patient summary reviewed and Nursing notes reviewed   NPO Solid Status: > 8 hours  NPO Liquid Status: > 8 hours           Airway   Mallampati: II  TM distance: >3 FB  Neck ROM: full  Possible difficult intubation  Dental      Pulmonary    Cardiovascular     (+) hyperlipidemia,       Neuro/Psych  (+) headaches,    GI/Hepatic/Renal/Endo    (+)  GERD,      Musculoskeletal     Abdominal    Substance History      OB/GYN          Other   arthritis,    history of cancer                    Anesthesia Plan    ASA 2     MAC     intravenous induction     Anesthetic plan, risks, benefits, and alternatives have been provided, discussed and informed consent has been obtained with: patient.        CODE STATUS:

## 2022-06-14 NOTE — DISCHARGE INSTRUCTIONS
- Discharge patient to home (ambulatory).   - Resume previous diet.   - Continue present medications.   - Await pathology results.   - Return to GI office in 8 weeks.     No pushing, pulling, tugging,  heavy lifting, or strenuous activity.  No major decision making, driving, or drinking alcoholic beverages for 24 hours. ( due to the medications you have  received)  Always use good hand hygiene/washing techniques.  NO driving while taking pain medications.    Rest today    To assist you in voiding:  Drink plenty of fluids  Listen to running water while attempting to void.    If you are unable to urinate and you have an uncomfortable urge to void or it has been   6 hours since you were discharged, return to the Emergency Room

## 2022-06-15 ENCOUNTER — TELEPHONE (OUTPATIENT)
Dept: GASTROENTEROLOGY | Facility: CLINIC | Age: 72
End: 2022-06-15

## 2022-06-15 LAB — REF LAB TEST METHOD: NORMAL

## 2022-06-15 RX ORDER — BUDESONIDE 3 MG/1
9 CAPSULE, COATED PELLETS ORAL EVERY MORNING
Qty: 90 CAPSULE | Refills: 2 | Status: SHIPPED | OUTPATIENT
Start: 2022-06-15 | End: 2022-07-20

## 2022-06-15 NOTE — TELEPHONE ENCOUNTER
I called the patient regarding her pathology report.  Allergy consistent with lymphocytic colitis.  Unfortunately patient is on a NSAIDs including meloxicam and ibuprofen and also on a PPI Prilosec all these 3 or potential culprits for microscopic colitis.    At this time patient does not want to hold Prilosec as Pepcid in the past did not help her.    Advised to hold meloxicam and ibuprofen for now.  She could take Tylenol with codeine Tylenol 3 for her knee pain.    We will start her on budesonide 9 mg p.o. daily for now and will reassess with a follow-up in 8 weeks time.

## 2022-07-20 ENCOUNTER — OFFICE VISIT (OUTPATIENT)
Dept: PSYCHIATRY | Facility: CLINIC | Age: 72
End: 2022-07-20

## 2022-07-20 VITALS
WEIGHT: 124 LBS | DIASTOLIC BLOOD PRESSURE: 68 MMHG | BODY MASS INDEX: 21.97 KG/M2 | HEART RATE: 72 BPM | HEIGHT: 63 IN | SYSTOLIC BLOOD PRESSURE: 114 MMHG

## 2022-07-20 DIAGNOSIS — R45.4 DIFFICULTY CONTROLLING ANGER: ICD-10-CM

## 2022-07-20 DIAGNOSIS — F43.10 POST TRAUMATIC STRESS DISORDER (PTSD): ICD-10-CM

## 2022-07-20 DIAGNOSIS — F33.1 MODERATE RECURRENT MAJOR DEPRESSION: Primary | ICD-10-CM

## 2022-07-20 PROCEDURE — 90792 PSYCH DIAG EVAL W/MED SRVCS: CPT | Performed by: NURSE PRACTITIONER

## 2022-07-20 RX ORDER — CHOLESTYRAMINE LIGHT 4 G/5.7G
4 POWDER, FOR SUSPENSION ORAL 2 TIMES DAILY
COMMUNITY
End: 2022-08-22

## 2022-07-20 RX ORDER — DULOXETIN HYDROCHLORIDE 60 MG/1
60 CAPSULE, DELAYED RELEASE ORAL DAILY
Qty: 30 CAPSULE | Refills: 2 | Status: SHIPPED | OUTPATIENT
Start: 2022-07-20

## 2022-07-20 RX ORDER — GABAPENTIN 300 MG/1
300 CAPSULE ORAL AS NEEDED
COMMUNITY
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
## 2022-08-22 ENCOUNTER — OFFICE VISIT (OUTPATIENT)
Dept: GASTROENTEROLOGY | Facility: CLINIC | Age: 72
End: 2022-08-22

## 2022-08-22 VITALS
HEIGHT: 63 IN | SYSTOLIC BLOOD PRESSURE: 102 MMHG | DIASTOLIC BLOOD PRESSURE: 60 MMHG | BODY MASS INDEX: 22.08 KG/M2 | WEIGHT: 124.6 LBS

## 2022-08-22 DIAGNOSIS — K21.9 GASTROESOPHAGEAL REFLUX DISEASE WITHOUT ESOPHAGITIS: ICD-10-CM

## 2022-08-22 DIAGNOSIS — K52.832 LYMPHOCYTIC COLITIS: Primary | ICD-10-CM

## 2022-08-22 DIAGNOSIS — K58.0 IRRITABLE BOWEL SYNDROME WITH DIARRHEA: ICD-10-CM

## 2022-08-22 PROCEDURE — 99213 OFFICE O/P EST LOW 20 MIN: CPT | Performed by: INTERNAL MEDICINE

## 2022-08-22 NOTE — PROGRESS NOTES
"     Follow Up Note     Date: 2022   Patient Name: Klaudia Saez  MRN: [id number]  : 1950     Referring Physician: Trena Arias MD    Chief Complaint:    Chief Complaint   Patient presents with   • Follow-up   • Nausea       Interval History:   2022  Klaudia Saez is a 72 y.o. female who is here today for follow up for her microscopic colitis.  She stopped her NSAID use since then her bowel movement improved.  She was prescribed budesonide 8-week course however patient does not remember taking that.  She also went through [facility] IBS CBT.  She changed her diet as well.  All these helped her and currently having more or less normal bowel movement.  Has cut down her marijuana use.    2021  She has a history of diarrhea for many years with 1-5 loose to watery bowel movements per day. She has occasional incontinence of bowels and she is not aware it has happened. She has urgency associated with bowel movements. Eating makes symptoms worse.  No abdominal pain. No rectal bleeding. She has occasional vomiting during a bowel movement, but this is unchanged.  Her last colonoscopy was about 3 months ago at [facility], she was told everything was \"normal\". Results are not available.     She has a history of severe reflux. She is taking Omeprazole 20 mg daily with reasonable control of reflux. No difficulty swallowing.  There is no family history of GI malignancy or IBD.     Subjective      Past Medical History:   Past Medical History:   Diagnosis Date   • Arthritis     REPORTS BOTHERSOME IN HER NECK   • Body piercing     EARS   • Cancer (HCC)     SKIN (REMOVED FROM NOSE)   • Elevated cholesterol    • Fibromyalgia    • Functional diarrhea    • GERD (gastroesophageal reflux disease)    • H/O bone density study    • H/O exercise stress test     REPORTS APPROXIMATELY  AND THAT ALL WAS WNL'S   • H/O mammogram 2016   • Headache    • Hearing loss     REPORTS MINIMAL AND " ONLY WITH CERTAIN FREQUENCIES. NO HEARING AIDS   • History of bronchitis    • History of TMJ syndrome    • Hyperlipemia    • IBS (irritable bowel syndrome)    • Rectocele    • Seasonal allergies    • TB (tuberculosis)     REPORTS +TB SKIN TEST IN THE 80'S.  REPORTS SHE HAD TREATMENT FOR 1 YEAR BUT NO ACTIVE DISEASE.    • Wears glasses     NON RX FOR READING     Past Surgical History:   Past Surgical History:   Procedure Laterality Date   • ANTERIOR AND POSTERIOR VAGINAL REPAIR N/A 08/28/2018    Procedure: ANTERIOR AND POSTERIOR VAGINAL REPAIR, ENTEROCELE REPAIR;  Surgeon: Teressa Gomes MD;  Location: [facility] OR;  Service: Obstetrics/Gynecology   • BILATERAL OOPHORECTOMY     • BLADDER REPAIR  2001   • CHOLECYSTECTOMY  2005   • COLONOSCOPY  2011   • COLONOSCOPY  05/25/2021   • COLONOSCOPY N/A 6/14/2022    Procedure: COLONOSCOPY WITH BIOPSY;  Surgeon: Mario Alberto Mittal MD;  Location: [facility] ENDOSCOPY;  Service: Gastroenterology;  Laterality: N/A;   • CYST REMOVAL      POSTERIOR UPPER LEFT THIGH AND LEFT SIDE OF NECK   • CYSTOSCOPY N/A 08/28/2018    Procedure: CYSTOSCOPY;  Surgeon: Teressa Gomes MD;  Location: [facility] OR;  Service: Obstetrics/Gynecology   • ELBOW ARTHROSCOPY Right 1996   • ENDOSCOPY  5 years ago   • EYE SURGERY Bilateral     CATARACT EXTRACTIONS   • GALLBLADDER SURGERY  2006   • HYSTERECTOMY  2002   • KNEE SURGERY      REPORTS 3 SURGERIES LEFT KNEE, 1 SURGERY RIGHT KNEE   • MOUTH SURGERY  04/2022   • SKIN BIOPSY      REPORTS SKIN CANCER REMOVED FROM NOSE   • TUBAL ABDOMINAL LIGATION     • UPPER GASTROINTESTINAL ENDOSCOPY  2007   • WISDOM TOOTH EXTRACTION      EXTRACTIONS ON THE LEFT SIDE (TOP AND BOTTOM)       Family History:   Family History   Problem Relation Age of Onset   • Ovarian cancer Mother    • Diabetes Mother    • Heart disease Mother    • Osteoporosis Sister    • Diabetes Brother    • Heart disease Brother    • Stroke Brother    • Colon polyps Brother    • Colon cancer Neg Hx    • Rectal  cancer Neg Hx    • Stomach cancer Neg Hx    • Liver cancer Neg Hx        Social History:   Social History     Socioeconomic History   • Marital status:    Tobacco Use   • Smoking status: Never Smoker   • Smokeless tobacco: Never Used   Vaping Use   • Vaping Use: Never used   Substance and Sexual Activity   • Alcohol use: No   • Drug use: Yes     Frequency: 7.0 times per week     Types: Marijuana     Comment: for fms   • Sexual activity: Yes     Partners: Male     Birth control/protection: Tubal ligation     Comment: total hysteroctomy       Medications:     Current Outpatient Medications:   •  Ascorbic Acid (VITAMIN C GUMMIE PO), Take 1 tablet by mouth Daily., Disp: , Rfl:   •  atorvastatin (LIPITOR) 80 MG tablet, Take 80 mg by mouth Daily., Disp: , Rfl:   •  cholecalciferol (VITAMIN D3) 1000 units tablet, Take 1,000 Units by mouth Daily., Disp: , Rfl:   •  dicyclomine (BENTYL) 20 MG tablet, Take 1 tablet by mouth 2 (Two) Times a Day As Needed (Abdominal pain)., Disp: 60 tablet, Rfl: 2  •  DULoxetine (CYMBALTA) 60 MG capsule, Take 1 capsule by mouth Daily., Disp: 30 capsule, Rfl: 2  •  escitalopram (LEXAPRO) 10 MG tablet, Take 10 mg by mouth Daily., Disp: , Rfl:   •  estradiol (ESTRACE VAGINAL) 0.1 MG/GM vaginal cream, Use 0.5 grams intravaginally twice weekly to control symptoms., Disp: 1 each, Rfl: 11  •  gabapentin (NEURONTIN) 300 MG capsule, Take 300 mg by mouth 3 (Three) Times a Day., Disp: , Rfl:   •  levocetirizine (XYZAL) 5 MG tablet, Take 5 mg by mouth Every Evening., Disp: , Rfl:   •  MULTIPLE VITAMIN PO, Take 1 tablet by mouth Daily., Disp: , Rfl:   •  omeprazole (priLOSEC) 20 MG capsule, Take 20 mg by mouth Daily., Disp: , Rfl:   •  tiZANidine (ZANAFLEX) 4 MG tablet, Take 4 mg by mouth At Night As Needed for Muscle Spasms., Disp: , Rfl:   •  vitamin B-12 (CYANOCOBALAMIN) 1000 MCG tablet, Take 1,000 mcg by mouth Daily., Disp: , Rfl:     Allergies:   Allergies   Allergen Reactions   • Prednisone  "Other (See Comments)     REPORTS CAUSED VISUAL DISTURBANCE (ACUTE BLINDNESS) AND HYPOGLYCEMIA.  REPORTS SHE IS ABLE TO TAKE STEROID INJECTIONS, BUT THAT SHE IS NOT ABLE TO TOLERATE ORAL DOSES FOR PROLONGED PERIODS OF TIME.   • Codeine Unknown - Low Severity       Review of Systems:   Review of Systems   Constitutional: Negative for appetite change, fatigue, fever and unexpected weight loss.   HENT: Negative for trouble swallowing.    Gastrointestinal: Negative for abdominal distention, abdominal pain, anal bleeding, blood in stool, constipation, diarrhea, nausea, rectal pain, vomiting, GERD and indigestion.       The following portions of the patient's history were reviewed and updated as appropriate: allergies, current medications, past family history, past medical history, past social history, past surgical history and problem list.    Objective     Physical Exam:  Vital Signs:   Vitals:    08/22/22 1512   BP: 102/60   Weight: 56.5 kg (124 lb 9.6 oz)   Height: 160 cm (63\")       Physical Exam  Constitutional:       Appearance: Normal appearance.   HENT:      Head: Normocephalic and atraumatic.   Eyes:      Conjunctiva/sclera: Conjunctivae normal.   Abdominal:      General: Abdomen is flat. There is no distension.      Palpations: There is no mass.      Tenderness: There is no abdominal tenderness. There is no guarding or rebound.      Hernia: No hernia is present.   Musculoskeletal:      Cervical back: Normal range of motion and neck supple.   Neurological:      Mental Status: She is alert.         Results Review:   I reviewed the patient's new clinical results.    Admission on 06/14/2022, Discharged on 06/14/2022   Component Date Value Ref Range Status   • Reference Lab Report 06/14/2022    Final                    Value:[facility]  [address] [place] [address]  Phone: [phone] or [phone]  Fax: [phone]  Gabriel Morley M.D., Medical Director    PATIENT NAME      "                      LABORATORY [address].  [id number]  CR[address]AL MCBRIDE.                    [id number]  [id number]                         AGE              SEX  N           CLIENT REF #  [facility]            71      1950  F    [id number]   [id number]    [address]                REQUESTING M.D.     ATTENDING M.D.     COPY TO.  [address]                        [place] [address]                 SHREYA YOUNG  DATE COLLECTED      DATE RECEIVED      DATE REPORTED  2022          2022         06/15/2022    DIAGNOSIS:  A.   TERMINAL ILEUM BIOPSY:  Ileal-type mucosa with no significant pathologic abnormalities  Negative for acute inflammation, granulomas and dysplasia  B.   RANDOM COLON BIOPSIES, LEFT, RIGHT, AND                           TRANSVERSE:  Lymphocytic colitis (see comment)    COMMENT:  The histologic sections show colorectal-type mucosa with dramatically increased  intraepithelial lymphocytes and associated reactive epithelial changes including  mucin depletion. There is no significant thickening of the subepithelial collagen  table. Although there are scattered intraepithelial neutrophils, this is not the  predominant finding. Features of chronicity (including a basal  lymphoplasmacytosis) are not prominent.    Overall, these findings are compatible with lymphocytic colitis. However, an  expanding list of medications is implicated in lymphocytic-pattern colitis  (including NSAIDs, losartan, and ranitidine, among many others). Additionally,  some infections can cause a lymphocytic-pattern colitis. As such, clinical and  endoscopic correlation is recommended.    CLINICAL HISTORY:  Diarrhea, unspecified type    SPECIMENS RECEIVED:  A.  TERMINAL ILEUM BIOPSY  B.  RANDOM COLON BIOPSIES , LEFT, RIGHT, AND                           TRANSVERSE    MICROSCOPIC DESCRIPTION:  Tissue blocks are prepared and slides are examined  "microscopically on all  specimens. See diagnosis for details.    Professional interpretation rendered by Chayo Draper D.O.,JOAQUIN at [facility], [address] [place] [address].    GROSS DESCRIPTION:  A.  Specimen is received in 1 formalin filled container labeled \"terminal ileum  biopsy\" and consists of a single portion of tan soft tissue measuring 0.3 x  0.2 x 0.2 cm.  The specimen is submitted entirely in 1 cassette.  B.    Specimen is received in 1 formalin filled container labeled \"colon  biopsies\" and consists of multiple portions of tan soft tissue measuring 0.6  x 0.4 x 0.1 cm.  The specimen is filtered and submitted entirely in 1  cassette.    REVIEWED, DIAGNOSED AND ELECTRONICALLY  SIGNED BY:    Chayo Draper D.O.,HIPOLITO.  CPT CODES:  88305x2        No radiology results for the last 90 days.      Colon 6/14/2022  Small internal/external hemorrhoids otherwise unremarkable colonoscopy.  Random colon biopsies obtained and TI biopsies obtained.  Path ; random colon biopsy consistent with lymphocytic colitis, TI normal       Assessment / Plan      1.   NSAID induced microscopic colitis  2.  Suspected underlying irritable bowel with diarrhea  3.  Gastroesophageal reflux disease without esophagitis  8/22/2022  She stopped taking NSAIDs now.  She was prescribed budesonide 8-week course but patient states that she may not have taken that one.  She changed her diet as well and cut down her marijuana use.  She states that [facility] IBS CBT therapy helped her.  Colonoscopy done on 6/14/2022 did not reveal any endoscopic signs of colitis or ileitis.  Random colon biopsies consistent with lymphocytic colitis and TI biopsy was normal.    Her bowel movements more or less normalized now.  Once in a while she will get minimal loose stool for which she takes Imodium as needed.  She is on a Prilosec 20 g p.o. daily that can also predispose her to microscopic colitis however she does not want to stop PPI as " she tried before which caused a rebound reflux difficult to treat in the past.    Will continue with Imodium half tablets 1 tablets as needed  Continue to avoid NSAIDs  We will continue the low-dose PPI Prilosec 20 mg p.o. daily for now  No immediate indication for CT abdomen pelvis for now.  We will follow-up in 1 year time    4/27/2022  Patient has a longstanding history of loose stools, diffuse abdominal pain, nausea.  She had a gallbladder removed many years ago for the same symptoms.  Patient also has a fibromyalgia.  She is also smoking marijuana regularly which is possibly aggravating her abdominal symptoms.      She is also on long-term NSAIDs intermittently including meloxicam, ibuprofen, Naprosyn.  She is also on a Prilosec for many years. All these medications are considered one of the etiology for microscopic colitis that needs to be ruled out. Unfortunately she had a colonoscopy done in May 2021 at [facility] and no colon or small bowel biopsies obtained.  Her lab work done in November 2021 including CBC CMP TSH were normal.  Her celiac serology was negative.  Fecal calprotectin and fecal elastase was normal.  Recent stool studies including stool C. difficile and GI panel negative.       Prior history  4. Personal history of colon polyps  Colonoscopy done 6/14/2022 did not reveal any colon polyps.  Comment if it screening colonoscopy in 2032 in 10 years time  She had colonoscopy dated 5/25/2021 per Dr. Serna that was unremarkable, no specimens collected.   Colonoscopy in 2018 polyps removed however pathology was hyperplastic          Follow Up:   No follow-ups on file.    Mario Alberto Mittal MD  [facility]  8/22/2022  15:15 EDT     Please note that portions of this note may have been completed with a voice recognition program.

## 2023-02-14 ENCOUNTER — OFFICE VISIT (OUTPATIENT)
Dept: GASTROENTEROLOGY | Facility: CLINIC | Age: 73
End: 2023-02-14
Payer: MEDICARE

## 2023-02-14 VITALS
TEMPERATURE: 97.2 F | SYSTOLIC BLOOD PRESSURE: 131 MMHG | OXYGEN SATURATION: 100 % | DIASTOLIC BLOOD PRESSURE: 68 MMHG | HEART RATE: 51 BPM | WEIGHT: 123 LBS | BODY MASS INDEX: 21.79 KG/M2 | HEIGHT: 63 IN

## 2023-02-14 DIAGNOSIS — K58.0 IRRITABLE BOWEL SYNDROME WITH DIARRHEA: ICD-10-CM

## 2023-02-14 DIAGNOSIS — K52.832 LYMPHOCYTIC COLITIS: Primary | ICD-10-CM

## 2023-02-14 DIAGNOSIS — K21.9 GASTROESOPHAGEAL REFLUX DISEASE WITHOUT ESOPHAGITIS: ICD-10-CM

## 2023-02-14 PROCEDURE — 99214 OFFICE O/P EST MOD 30 MIN: CPT | Performed by: INTERNAL MEDICINE

## 2023-02-14 NOTE — PROGRESS NOTES
"     Follow Up Note     Date: 2023   Patient Name: Klaudia Saez  MRN: [id number]  : 1950     Referring Physician: Trena Arias MD    Chief Complaint:    Chief Complaint   Patient presents with   • Lymphocytic Colitis       Interval History:   2023  Klaudia Saez is a 72 y.o. female who is here today for follow up for her nausea, diarrhea and abdominal pain.  She states that she had a recent flareup with diarrheal episodes with nausea vomiting and abdominal pain that has almost resolved now.  We will have anywhere 3-4 loose stools now    2021  She has a history of diarrhea for many years with 1-5 loose to watery bowel movements per day. She has occasional incontinence of bowels and she is not aware it has happened. She has urgency associated with bowel movements. Eating makes symptoms worse.  No abdominal pain. No rectal bleeding. She has occasional vomiting during a bowel movement, but this is unchanged.  Her last colonoscopy was about 3 months ago at [facility], she was told everything was \"normal\". Results are not available.     She has a history of severe reflux. She is taking Omeprazole 20 mg daily with reasonable control of reflux. No difficulty swallowing.  There is no family history of GI malignancy or IBD.     Subjective      Past Medical History:   Past Medical History:   Diagnosis Date   • Arthritis     REPORTS BOTHERSOME IN HER NECK   • Body piercing     EARS   • Cancer (HCC)     SKIN (REMOVED FROM NOSE)   • Elevated cholesterol    • Fibromyalgia    • Functional diarrhea    • GERD (gastroesophageal reflux disease)    • H/O bone density study    • H/O exercise stress test     REPORTS APPROXIMATELY  AND THAT ALL WAS WNL'S   • H/O mammogram 2016   • Headache    • Hearing loss     REPORTS MINIMAL AND ONLY WITH CERTAIN FREQUENCIES. NO HEARING AIDS   • History of bronchitis    • History of TMJ syndrome    • Hyperlipemia    • IBS (irritable bowel " syndrome)    • Rectocele    • Seasonal allergies    • TB (tuberculosis)     REPORTS +TB SKIN TEST IN THE 80'S.  REPORTS SHE HAD TREATMENT FOR 1 YEAR BUT NO ACTIVE DISEASE.    • Wears glasses     NON RX FOR READING     Past Surgical History:   Past Surgical History:   Procedure Laterality Date   • ANTERIOR AND POSTERIOR VAGINAL REPAIR N/A 08/28/2018    Procedure: ANTERIOR AND POSTERIOR VAGINAL REPAIR, ENTEROCELE REPAIR;  Surgeon: Teressa Gomes MD;  [facility];  Service: Obstetrics/Gynecology   • BILATERAL OOPHORECTOMY     • BLADDER REPAIR  2001   • CHOLECYSTECTOMY  2005   • COLONOSCOPY  2011   • COLONOSCOPY  05/25/2021   • COLONOSCOPY N/A 06/14/2022    Procedure: COLONOSCOPY WITH BIOPSY;  Surgeon: Mario Alberto Mittal MD;  [facility];  Service: Gastroenterology;  Laterality: N/A;   • CYST REMOVAL      POSTERIOR UPPER LEFT THIGH AND LEFT SIDE OF NECK   • CYSTOSCOPY N/A 08/28/2018    Procedure: CYSTOSCOPY;  Surgeon: Teressa Gomes MD;  [facility];  Service: Obstetrics/Gynecology   • ELBOW ARTHROSCOPY Right 1996   • ENDOSCOPY  5 years ago   • EYE SURGERY Bilateral     CATARACT EXTRACTIONS   • GALLBLADDER SURGERY  2006   • HYSTERECTOMY  2002   • KNEE SURGERY      REPORTS 3 SURGERIES LEFT KNEE, 1 SURGERY RIGHT KNEE   • MOUTH SURGERY  04/2022   • SKIN BIOPSY      REPORTS SKIN CANCER REMOVED FROM NOSE   • TUBAL ABDOMINAL LIGATION     • UPPER GASTROINTESTINAL ENDOSCOPY  2007   • WISDOM TOOTH EXTRACTION      EXTRACTIONS ON THE LEFT SIDE (TOP AND BOTTOM)       Family History:   Family History   Problem Relation Age of Onset   • Ovarian cancer Mother    • Diabetes Mother    • Heart disease Mother    • Osteoporosis Sister    • Diabetes Brother    • Heart disease Brother    • Stroke Brother    • Colon polyps Brother    • Colon cancer Neg Hx    • Rectal cancer Neg Hx    • Stomach cancer Neg Hx    • Liver cancer Neg Hx        Social History:   Social History     Socioeconomic History   • Marital  status:    Tobacco Use   • Smoking status: Never   • Smokeless tobacco: Never   Vaping Use   • Vaping Use: Never used   Substance and Sexual Activity   • Alcohol use: No   • Drug use: Yes     Frequency: 7.0 times per week     Types: Marijuana     Comment: for fms   • Sexual activity: Yes     Partners: Male     Birth control/protection: Tubal ligation     Comment: total hysteroctomy       Medications:     Current Outpatient Medications:   •  Ascorbic Acid (VITAMIN C GUMMIE PO), Take 1 tablet by mouth Daily., Disp: , Rfl:   •  atorvastatin (LIPITOR) 80 MG tablet, Take 80 mg by mouth Daily., Disp: , Rfl:   •  cholecalciferol (VITAMIN D3) 1000 units tablet, Take 1,000 Units by mouth Daily., Disp: , Rfl:   •  dicyclomine (BENTYL) 20 MG tablet, Take 1 tablet by mouth 2 (Two) Times a Day As Needed (Abdominal pain)., Disp: 60 tablet, Rfl: 2  •  DULoxetine (CYMBALTA) 60 MG capsule, Take 1 capsule by mouth Daily., Disp: 30 capsule, Rfl: 2  •  escitalopram (LEXAPRO) 10 MG tablet, Take 10 mg by mouth Daily., Disp: , Rfl:   •  estradiol (ESTRACE VAGINAL) 0.1 MG/GM vaginal cream, Use 0.5 grams intravaginally twice weekly to control symptoms., Disp: 1 each, Rfl: 11  •  gabapentin (NEURONTIN) 300 MG capsule, Take 300 mg by mouth 3 (Three) Times a Day. PRN, Disp: , Rfl:   •  levocetirizine (XYZAL) 5 MG tablet, Take 5 mg by mouth Every Evening., Disp: , Rfl:   •  MULTIPLE VITAMIN PO, Take 1 tablet by mouth Daily., Disp: , Rfl:   •  omeprazole (priLOSEC) 20 MG capsule, Take 20 mg by mouth Daily., Disp: , Rfl:   •  tiZANidine (ZANAFLEX) 4 MG tablet, Take 4 mg by mouth At Night As Needed for Muscle Spasms., Disp: , Rfl:   •  vitamin B-12 (CYANOCOBALAMIN) 1000 MCG tablet, Take 1,000 mcg by mouth Daily., Disp: , Rfl:   •  Diclofenac Sodium (VOLTAREN) 1 % gel gel, , Disp: , Rfl:     Allergies:   Allergies   Allergen Reactions   • Prednisone Other (See Comments)     REPORTS CAUSED VISUAL DISTURBANCE (ACUTE BLINDNESS) AND  "HYPOGLYCEMIA.  REPORTS SHE IS ABLE TO TAKE STEROID INJECTIONS, BUT THAT SHE IS NOT ABLE TO TOLERATE ORAL DOSES FOR PROLONGED PERIODS OF TIME.   • Codeine Unknown - Low Severity       Review of Systems:   Review of Systems   Constitutional: Positive for appetite change and fatigue. Negative for fever and unexpected weight loss.   HENT: Positive for trouble swallowing.    Gastrointestinal: Positive for abdominal pain, diarrhea, nausea and vomiting. Negative for abdominal distention, anal bleeding, blood in stool, constipation, rectal pain, GERD and indigestion.       The following portions of the patient's history were reviewed and updated as appropriate: allergies, current medications, past family history, past medical history, past social history, past surgical history and problem list.    Objective     Physical Exam:  Vital Signs:   Vitals:    02/14/23 1619   BP: 131/68   Pulse: 51   Temp: 97.2 °F (36.2 °C)   SpO2: 100%   Weight: 55.8 kg (123 lb)   Height: 160 cm (63\")       Physical Exam  Constitutional:       Appearance: Normal appearance.   HENT:      Head: Normocephalic and atraumatic.   Eyes:      Conjunctiva/sclera: Conjunctivae normal.   Abdominal:      General: Abdomen is flat. There is no distension.      Palpations: There is no mass.      Tenderness: There is no abdominal tenderness. There is no guarding or rebound.      Hernia: No hernia is present.   Musculoskeletal:      Cervical back: Normal range of motion and neck supple.   Neurological:      Mental Status: She is alert.         Results Review:   I reviewed the patient's new clinical results.    No visits with results within 90 Day(s) from this visit.   Latest known visit with results is:   Admission on 06/14/2022, Discharged on 06/14/2022   Component Date Value Ref Range Status   • Reference Lab Report 06/14/2022    Final                    Value:[facility]  [address] [id number]  Phone: [phone] or " [phone]  Fax: [phone]  Gabriel Morley M.D., Medical Director    PATIENT NAME                           LABORATORY NO.  CRISTAL SWEENEY.                    [id number]  [id number]                         AGE              SEX  N           CLIENT REF #  [facility]            71      1950  F    [id number]   [id number]    [address]                REQUESTING M.D.     ATTENDING M.D.     COPY TO.   [address]                        [place] [address]                 SHREYA YOUNG  DATE COLLECTED      DATE RECEIVED      DATE REPORTED  2022          2022         06/15/2022    DIAGNOSIS:  A.   TERMINAL ILEUM BIOPSY:  Ileal-type mucosa with no significant pathologic abnormalities  Negative for acute inflammation, granulomas and dysplasia  B.   RANDOM COLON BIOPSIES, LEFT, RIGHT, AND                           TRANSVERSE:  Lymphocytic colitis (see comment)    COMMENT:  The histologic sections show colorectal-type mucosa with dramatically increased  intraepithelial lymphocytes and associated reactive epithelial changes including  mucin depletion. There is no significant thickening of the subepithelial collagen  table. Although there are scattered intraepithelial neutrophils, this is not the  predominant finding. Features of chronicity (including a basal  lymphoplasmacytosis) are not prominent.    Overall, these findings are compatible with lymphocytic colitis. However, an  expanding list of medications is implicated in lymphocytic-pattern colitis  (including NSAIDs, losartan, and ranitidine, among many others). Additionally,  some infections can cause a lymphocytic-pattern colitis. As such, clinical and  endoscopic correlation is recommended.    CLINICAL HISTORY:  Diarrhea, unspecified type    SPECIMENS RECEIVED:  A.  TERMINAL ILEUM BIOPSY  B.  RANDOM COLON BIOPSIES , LEFT, RIGHT, AND                            "TRANSVERSE    MICROSCOPIC DESCRIPTION:  Tissue blocks are prepared and slides are examined microscopically on all  specimens. See diagnosis for details.    Professional interpretation rendered by Chayo Draper D.O.,JOAQUIN at [facility], [address] [place] [address].    GROSS DESCRIPTION:  A.  Specimen is received in 1 formalin filled container labeled \"terminal ileum  biopsy\" and consists of a single portion of tan soft tissue measuring 0.3 x  0.2 x 0.2 cm.  The specimen is submitted entirely in 1 cassette.  B.    Specimen is received in 1 formalin filled container labeled \"colon  biopsies\" and consists of multiple portions of tan soft tissue measuring 0.6  x 0.4 x 0.1 cm.  The specimen is filtered and submitted entirely in 1  cassette.    REVIEWED, DIAGNOSED AND ELECTRONICALLY  SIGNED BY:    Chayo Draper D.O.,JOAQUIN  CPT CODES:  88305x2        XR Spine Thoracic 3 View    Result Date: 11/17/2022  Degenerative change without evidence of acute process. Images reviewed, interpreted, and dictated by Dr. Azar Chand. Transcribed by Elba Moran PA-C.    MR thoracic spine without IV contrast    Result Date: 12/6/2022  Degenerative change without significant central canal stenosis or acute fracture. Images reviewed, interpreted, and dictated by ZAIN Valiente MD      Colonoscopy 6/14/2022  Small internal/external hemorrhoids otherwise unremarkable colonoscopy.  Random colon biopsies obtained and TI biopsies obtained.  Path ; random colon biopsy consistent with lymphocytic colitis, TI normal     Assessment / Plan      1.   History of NSAID induced microscopic colitis  2.  Suspected underlying irritable bowel with diarrhea  3.  Gastroesophageal reflux disease without esophagitis  2/14/2023  Recently she had a flareup with nausea vomiting abdominal pain and diarrhea that has improved now.  She still continues to smoke marijuana that is certainly contributing some of her IBS symptoms.  She " is not on NSAIDs anymore    As per patient she had a trial of colestipol and cholestyramine which did not help.   We will start her on low-dose bismuth subsalicylate twice daily  Advised Imodium half tablets 1 tablets p.o. daily as needed while going out for shopping or travel  Trial of probiotic p.o. daily  Continue dicyclomine as before  Unfortunately patient developed significant reflux symptoms with Pepcid and had to be put back on a low-dose PPI    As per patient she had a lab work done at PCPs office and was been told normal except elevated cholesterol    8/22/2022  She stopped taking NSAIDs now.  She was prescribed budesonide 8-week course but patient states that she may not have taken that one.  She changed her diet as well and cut down her marijuana use.  She states that [facility] IBS CBT therapy helped her.  Colonoscopy done on 6/14/2022 did not reveal any endoscopic signs of colitis or ileitis.  Random colon biopsies consistent with lymphocytic colitis and TI biopsy was normal.  Her celiac serology was negative.  Fecal calprotectin and fecal elastase was normal.  Recent stool studies including stool C. difficile and GI panel negative.    4/27/2022  Patient has a longstanding history of loose stools, diffuse abdominal pain, nausea.  She had a gallbladder removed many years ago for the same symptoms.  Patient also has a fibromyalgia.  She is also smoking marijuana regularly which is possibly aggravating her abdominal symptoms.   She is also on long-term NSAIDs intermittently including meloxicam, ibuprofen, Naprosyn.  She is also on a Prilosec for many years. All these medications are considered one of the etiology for microscopic colitis that needs to be ruled out. Unfortunately she had a colonoscopy done in May 2021 at [facility] and no colon or small bowel biopsies obtained.  Her lab work done in November 2021 including CBC CMP TSH were normal.          Prior history  4. Personal history of colon  polyps  Colonoscopy done 6/14/2022 did not reveal any colon polyps.  Recommend screening colonoscopy in 2032 in 10 years time  She had colonoscopy dated 5/25/2021 per Dr. Serna that was unremarkable, no specimens collected.   Colonoscopy in 2018 polyps removed however pathology was hyperplastic             Follow Up:   No follow-ups on file.    Mario Alberto Mittal MD  [facility]  2/14/2023  16:27 EST     Please note that portions of this note may have been completed with a voice recognition program.

## 2023-03-06 ENCOUNTER — TELEPHONE (OUTPATIENT)
Dept: NEUROSURGERY | Facility: CLINIC | Age: 73
End: 2023-03-06
Payer: MEDICARE

## 2023-03-06 NOTE — TELEPHONE ENCOUNTER
I called and spoke to the patient and ask her if she had her disc.  She does and the report,she will have them with her.

## 2023-03-07 ENCOUNTER — OFFICE VISIT (OUTPATIENT)
Dept: NEUROSURGERY | Facility: CLINIC | Age: 73
End: 2023-03-07
Payer: MEDICARE

## 2023-03-07 VITALS
DIASTOLIC BLOOD PRESSURE: 60 MMHG | BODY MASS INDEX: 20.91 KG/M2 | HEIGHT: 63 IN | WEIGHT: 118 LBS | SYSTOLIC BLOOD PRESSURE: 98 MMHG

## 2023-03-07 DIAGNOSIS — D18.09 HEMANGIOMA OF BONE: ICD-10-CM

## 2023-03-07 DIAGNOSIS — M79.7 FIBROMYALGIA: ICD-10-CM

## 2023-03-07 DIAGNOSIS — F12.90 MARIJUANA USE: ICD-10-CM

## 2023-03-07 DIAGNOSIS — M54.6 PAIN IN THORACIC SPINE: Primary | ICD-10-CM

## 2023-03-07 DIAGNOSIS — G89.4 CHRONIC PAIN SYNDROME: ICD-10-CM

## 2023-03-07 PROCEDURE — 99204 OFFICE O/P NEW MOD 45 MIN: CPT | Performed by: PHYSICIAN ASSISTANT

## 2023-03-07 NOTE — PROGRESS NOTES
Patient: Klaudia Saez  : 1950  Chart #: [id number]    Date of Service: 2023    Chief Complaint   Patient presents with   • Back Pain       HPI  This is a 72-year-old female from [facility] who is referred by Dr. Arias for back pain.  The patient describes her pain as mid thoracic at times it will radiate to the right it can radiate upward toward her shoulder blade.  She denies any radicular symptoms.  Bladder function is intact she does have a history of colitis.  She states her pain has been going on for 6 months without precipitating events.  The patient does have fibromyalgia that was diagnosed several years ago by the fibromyalgia physician that used to be in [place].  She used to see a pain management physician at the hospital and they had previously give her gabapentin 300 mg as needed for pain however she has not seen them for 3 years.  She uses marijuana for pain and treatment of her colitis.  She has never had surgery on her spine.  She has an MRI of the thoracic spine for review today.    Chronic Illnesses:    Past Medical History:   Diagnosis Date   • Arthritis     REPORTS BOTHERSOME IN HER NECK   • Body piercing     EARS   • Cancer (HCC)     SKIN (REMOVED FROM NOSE)   • Elevated cholesterol    • Fibromyalgia    • Functional diarrhea    • GERD (gastroesophageal reflux disease)    • H/O bone density study    • H/O exercise stress test     REPORTS APPROXIMATELY 2014 AND THAT ALL WAS WNL'S   • H/O mammogram 2016   • Headache    • Hearing loss     REPORTS MINIMAL AND ONLY WITH CERTAIN FREQUENCIES. NO HEARING AIDS   • History of bronchitis    • History of TMJ syndrome    • Hyperlipemia    • IBS (irritable bowel syndrome)    • Low back pain 2022   • Rectocele    • Seasonal allergies    • TB (tuberculosis)     REPORTS +TB SKIN TEST IN THE S.  REPORTS SHE HAD TREATMENT FOR 1 YEAR BUT NO ACTIVE DISEASE.    • Wears glasses     NON RX FOR READING         Past Surgical  History:   Procedure Laterality Date   • ANTERIOR AND POSTERIOR VAGINAL REPAIR N/A 08/28/2018    Procedure: ANTERIOR AND POSTERIOR VAGINAL REPAIR, ENTEROCELE REPAIR;  Surgeon: Teressa Gomes MD;  Location: [facility] OR;  Service: Obstetrics/Gynecology   • BILATERAL OOPHORECTOMY     • BLADDER REPAIR  2001   • CARPAL TUNNEL RELEASE  2019   • CHOLECYSTECTOMY  2005   • COLONOSCOPY  2011   • COLONOSCOPY  05/25/2021   • COLONOSCOPY N/A 06/14/2022    Procedure: COLONOSCOPY WITH BIOPSY;  Surgeon: Mario Alberto Mittal MD;  Location: [facility] ENDOSCOPY;  Service: Gastroenterology;  Laterality: N/A;   • CYST REMOVAL      POSTERIOR UPPER LEFT THIGH AND LEFT SIDE OF NECK   • CYSTOSCOPY N/A 08/28/2018    Procedure: CYSTOSCOPY;  Surgeon: Teressa Gomes MD;  Location: [facility] OR;  Service: Obstetrics/Gynecology   • ELBOW ARTHROSCOPY Right 1996   • ENDOSCOPY  5 years ago   • EYE SURGERY Bilateral     CATARACT EXTRACTIONS   • GALLBLADDER SURGERY  2006   • HYSTERECTOMY  2002   • KNEE SURGERY      REPORTS 3 SURGERIES LEFT KNEE, 1 SURGERY RIGHT KNEE   • MOUTH SURGERY  04/2022   • SKIN BIOPSY      REPORTS SKIN CANCER REMOVED FROM NOSE   • TUBAL ABDOMINAL LIGATION     • UPPER GASTROINTESTINAL ENDOSCOPY  2007   • WISDOM TOOTH EXTRACTION      EXTRACTIONS ON THE LEFT SIDE (TOP AND BOTTOM)       Allergies   Allergen Reactions   • Prednisone Other (See Comments)     REPORTS CAUSED VISUAL DISTURBANCE (ACUTE BLINDNESS) AND HYPOGLYCEMIA.  REPORTS SHE IS ABLE TO TAKE STEROID INJECTIONS, BUT THAT SHE IS NOT ABLE TO TOLERATE ORAL DOSES FOR PROLONGED PERIODS OF TIME.  Other reaction(s): Blindness - both eyes  1oral prednisone only,, after multiple doses  REPORTS CAUSED VISUAL DISTURBANCE (ACUTE BLINDNESS) AND HYPOGLYCEMIA.  REPORTS SHE IS ABLE TO TAKE STEROID INJECTIONS, BUT THAT SHE IS NOT ABLE TO TOLERATE ORAL DOSES FOR PROLONGED PERIODS OF TIME.   • Nsaids Unknown - High Severity   • Codeine Unknown - Low Severity     Other reaction(s): Unknown - Low  Severity         Current Outpatient Medications:   •  Ascorbic Acid (VITAMIN C GUMMIE PO), Take 1 tablet by mouth Daily., Disp: , Rfl:   •  atorvastatin (LIPITOR) 80 MG tablet, Take 1 tablet by mouth Daily., Disp: , Rfl:   •  Bismuth Subsalicylate 262 MG tablet, Take 262 mg by mouth 2 (Two) Times a Day., Disp: 60 each, Rfl: 5  •  cholecalciferol (VITAMIN D3) 1000 units tablet, Take 1 tablet by mouth Daily., Disp: , Rfl:   •  DULoxetine (CYMBALTA) 60 MG capsule, Take 1 capsule by mouth Daily., Disp: 30 capsule, Rfl: 2  •  escitalopram (LEXAPRO) 10 MG tablet, Take 1 tablet by mouth Daily., Disp: , Rfl:   •  estradiol (ESTRACE VAGINAL) 0.1 MG/GM vaginal cream, Use 0.5 grams intravaginally twice weekly to control symptoms., Disp: 1 each, Rfl: 11  •  gabapentin (NEURONTIN) 300 MG capsule, Take 1 capsule by mouth As Needed. PRN, Disp: , Rfl:   •  levocetirizine (XYZAL) 5 MG tablet, Take 1 tablet by mouth Every Evening., Disp: , Rfl:   •  MULTIPLE VITAMIN PO, Take 1 tablet by mouth Daily., Disp: , Rfl:   •  omeprazole (priLOSEC) 20 MG capsule, Take 1 capsule by mouth Daily., Disp: , Rfl:   •  tiZANidine (ZANAFLEX) 4 MG tablet, Take 1 tablet by mouth At Night As Needed for Muscle Spasms., Disp: , Rfl:   •  vitamin B-12 (CYANOCOBALAMIN) 1000 MCG tablet, Take 1 tablet by mouth Daily., Disp: , Rfl:   •  Diclofenac Sodium (VOLTAREN) 1 % gel gel, , Disp: , Rfl:   •  dicyclomine (BENTYL) 20 MG tablet, Take 1 tablet by mouth 2 (Two) Times a Day As Needed (Abdominal pain)., Disp: 60 tablet, Rfl: 2    Social History     Socioeconomic History   • Marital status:    Tobacco Use   • Smoking status: Never   • Smokeless tobacco: Never   Vaping Use   • Vaping Use: Never used   Substance and Sexual Activity   • Alcohol use: No   • Drug use: Yes     Frequency: 7.0 times per week     Types: Marijuana     Comment: for fms   • Sexual activity: Yes     Partners: Male     Birth control/protection: Tubal ligation, Hysterectomy     Comment:  "total hysteroctomy       Family History   Problem Relation Age of Onset   • Ovarian cancer Mother    • Diabetes Mother    • Heart disease Mother    • Cancer Mother    • Vision loss Mother    • Osteoporosis Sister    • Early death Sister         Heart   • Diabetes Brother    • Heart disease Brother    • Stroke Brother    • Colon polyps Brother    • Diabetes Brother    • Colon cancer Neg Hx    • Rectal cancer Neg Hx    • Stomach cancer Neg Hx    • Liver cancer Neg Hx        BMI is within normal parameters. No other follow-up for BMI required.       Social History    Tobacco Use      Smoking status: Never      Smokeless tobacco: Never       Review of Systems   Musculoskeletal: Positive for arthralgias and back pain.        Gait & Balance Assessment:  Risk assessment for falls. Fall precautions:  such as;   • Using gait aids a cane, walker at the appropriate height at all times for ambulation or if necessary a wheelchair  • Removing all area rugs and coffee tables to create a safe environment at home  • Ensure clean, dry floors  • Wearing supportive footwear and properly fitting clothing  • Ensure bed/chair is appropriate height and patient's feet can touch the floor  • Using a shower transfer bench  • Using walk-in shower and having shower safety bars installed  • Ensure proper lighting, minimize glare  • Have nightlights operational and in use  • Participation in an exercise program for gait training, balance training and strength  • Avoid carrying laundry up and down steps  • Ensure proper compliance and organization of medications to avoid errors   • Avoid use of over the counter sedatives and alcohol consumption  • Ensure easy access to call bell, glasses, TV control, telephone  • Ensure glasses/hearing aids are in use or close by (on top of night table)     Physical examination:  Blood pressure 98/60, height 160 cm (63\"), weight 53.5 kg (118 lb), not currently breastfeeding.  HEENT- normocephalic, atraumatic, " sclera clear  Lungs-normal expansion, no wheezing  Heart-regular rate and rhythm  Extremities-positive pulses, no edema    Neurologic Exam  [id number]  A/A/C, speech clear, attention normal, conversant, answers questions appropriately, good historian.  Cranial nerves II through XII are intact.  Motor examination does not reveal weakness in the , upper or lower extremities.   Sensation is intact.  Gait is normal, balance is normal.   No tremors are noted.  Reflexes are intact.   Gutierrez is negative. Clonus is negative.   Palpation of the mid thoracic spine is mildly tender.  She has normal rotation of the spine with flexion extension and lateral rotation.    Radiographic Imaging:  For my review Thoracic MRI from December 6, 2022 shows a large hemangioma within a thoracic vertebra, I do not know the level, there is not a cervical or lumbar film for counting.  There is also a smaller hemangioma noted at 3 levels above the largest in the mid thoracic spine.  There is also hemangioma noted in the lower cervical vertebral body.    Medical Decision Making  Diagnoses and all orders for this visit:    1. Pain in thoracic spine (Primary)  -     Ambulatory Referral to Pain Management    2. Marijuana use    3. Fibromyalgia    4. Chronic pain syndrome    5. Hemangioma of bone    Other orders  -     MRI outside films; Future    Currently no role for neurosurgical intervention.  Her posture and alignment is normal.  I have made a referral to pain management in [place] and she can discuss with them if she would be a candidate for their treatment.  There appears to be a hemangioma within the lower cervical vertebrae as well, the patient would benefit from a baseline cervical MRI for documentation.      Jackelin Cordoba, PAC    Patient Care Team:  Trnea Arias MD as PCP - General (Family Medicine)

## 2023-03-30 ENCOUNTER — TELEPHONE (OUTPATIENT)
Dept: NEUROSURGERY | Facility: CLINIC | Age: 73
End: 2023-03-30
Payer: MEDICARE

## 2023-03-30 NOTE — TELEPHONE ENCOUNTER
Provider:  Adalid  Surgery/Procedure:  taar  Surgery/Procedure Date:  tara  Last visit:   3-7-23  Next visit: na     Reason for call: Patient called and said that she has not heard anything from PM.    I have called and left a VM letting her know to check with Dr. Harrell's office to see when her appointment will be.

## 2023-05-04 ENCOUNTER — OFFICE VISIT (OUTPATIENT)
Dept: OBSTETRICS AND GYNECOLOGY | Facility: CLINIC | Age: 73
End: 2023-05-04
Payer: MEDICARE

## 2023-05-04 VITALS
WEIGHT: 121 LBS | DIASTOLIC BLOOD PRESSURE: 62 MMHG | HEIGHT: 63 IN | BODY MASS INDEX: 21.44 KG/M2 | SYSTOLIC BLOOD PRESSURE: 102 MMHG

## 2023-05-04 DIAGNOSIS — N95.2 POSTMENOPAUSAL ATROPHIC VAGINITIS: ICD-10-CM

## 2023-05-04 DIAGNOSIS — R10.31 RIGHT LOWER QUADRANT PAIN: ICD-10-CM

## 2023-05-04 DIAGNOSIS — N94.10 DYSPAREUNIA IN FEMALE: Primary | ICD-10-CM

## 2023-05-04 DIAGNOSIS — K52.9 COLITIS: ICD-10-CM

## 2023-05-04 PROCEDURE — 99214 OFFICE O/P EST MOD 30 MIN: CPT | Performed by: OBSTETRICS & GYNECOLOGY

## 2023-05-04 PROCEDURE — 1160F RVW MEDS BY RX/DR IN RCRD: CPT | Performed by: OBSTETRICS & GYNECOLOGY

## 2023-05-04 PROCEDURE — 1159F MED LIST DOCD IN RCRD: CPT | Performed by: OBSTETRICS & GYNECOLOGY

## 2023-05-08 NOTE — PROGRESS NOTES
Chief Complaint  Dyspareunia (Patient complains of right lower quadrant pain during intercourse x 6-7 months. )     History of Present Illness:  Patient is 72 y.o.  who presents to [facility] here for evaluation of right lower quadrant pain as well as dyspareunia.  Patient gives a history of being diagnosed with colitis.  She has had long-term GI issues with frequent episodes of diarrhea.  She was recently diagnosed with GI.  She had recent colonoscopy as noted.  She reports having right lower quadrant pain for the last 6 to 7 months.  She reports previously she had not been sexually active secondary to her GI issues with diarrhea and fecal incontinence.  She has now started being more sexually active.  She has been having pain with deep penetration.  She reports the pain is predominantly right lower quadrant in nature.  She denies any vaginal bleeding or spotting.  She has been using lubricants.  She has had imaging of her spine as noted.  She has not had a recent CT scan.    History  Past Medical History:   Diagnosis Date   • Arthritis     REPORTS BOTHERSOME IN HER NECK   • Body piercing     EARS   • Cancer     SKIN (REMOVED FROM NOSE)   • Elevated cholesterol    • Fibromyalgia    • Functional diarrhea    • GERD (gastroesophageal reflux disease)    • H/O bone density study    • H/O exercise stress test     REPORTS APPROXIMATELY 2014 AND THAT ALL WAS WNL'S   • H/O mammogram 2016   • Headache    • Hearing loss     REPORTS MINIMAL AND ONLY WITH CERTAIN FREQUENCIES. NO HEARING AIDS   • History of bronchitis    • History of TMJ syndrome    • Hyperlipemia    • IBS (irritable bowel syndrome)    • Low back pain 2022   • Rectocele    • Seasonal allergies    • TB (tuberculosis)     REPORTS +TB SKIN TEST IN THE .  REPORTS SHE HAD TREATMENT FOR 1 YEAR BUT NO ACTIVE DISEASE.    • Wears glasses     NON RX FOR READING     Current Outpatient Medications on File Prior to Visit    Medication Sig Dispense Refill   • Ascorbic Acid (VITAMIN C GUMMIE PO) Take 1 tablet by mouth Daily.     • atorvastatin (LIPITOR) 80 MG tablet Take 1 tablet by mouth Daily.     • Bismuth Subsalicylate 262 MG tablet Take 262 mg by mouth 2 (Two) Times a Day. 60 each 5   • cholecalciferol (VITAMIN D3) 1000 units tablet Take 1 tablet by mouth Daily.     • Diclofenac Sodium (VOLTAREN) 1 % gel gel      • dicyclomine (BENTYL) 20 MG tablet Take 1 tablet by mouth 2 (Two) Times a Day As Needed (Abdominal pain). 60 tablet 2   • DULoxetine (CYMBALTA) 60 MG capsule Take 1 capsule by mouth Daily. 30 capsule 2   • escitalopram (LEXAPRO) 10 MG tablet Take 1 tablet by mouth Daily.     • gabapentin (NEURONTIN) 300 MG capsule Take 1 capsule by mouth As Needed. PRN     • levocetirizine (XYZAL) 5 MG tablet Take 1 tablet by mouth Every Evening.     • MULTIPLE VITAMIN PO Take 1 tablet by mouth Daily.     • omeprazole (priLOSEC) 20 MG capsule Take 1 capsule by mouth Daily.     • tiZANidine (ZANAFLEX) 4 MG tablet Take 1 tablet by mouth At Night As Needed for Muscle Spasms.     • vitamin B-12 (CYANOCOBALAMIN) 1000 MCG tablet Take 1 tablet by mouth Daily.       No current facility-administered medications on file prior to visit.     Allergies   Allergen Reactions   • Prednisone Other (See Comments)     REPORTS CAUSED VISUAL DISTURBANCE (ACUTE BLINDNESS) AND HYPOGLYCEMIA.  REPORTS SHE IS ABLE TO TAKE STEROID INJECTIONS, BUT THAT SHE IS NOT ABLE TO TOLERATE ORAL DOSES FOR PROLONGED PERIODS OF TIME.  Other reaction(s): Blindness - both eyes  1oral prednisone only,, after multiple doses  REPORTS CAUSED VISUAL DISTURBANCE (ACUTE BLINDNESS) AND HYPOGLYCEMIA.  REPORTS SHE IS ABLE TO TAKE STEROID INJECTIONS, BUT THAT SHE IS NOT ABLE TO TOLERATE ORAL DOSES FOR PROLONGED PERIODS OF TIME.   • Nsaids Unknown - High Severity   • Codeine Unknown - Low Severity     Other reaction(s): Unknown - Low Severity     Past Surgical History:   Procedure Laterality  Date   • ANTERIOR AND POSTERIOR VAGINAL REPAIR N/A 08/28/2018    Procedure: ANTERIOR AND POSTERIOR VAGINAL REPAIR, ENTEROCELE REPAIR;  Surgeon: Teressa Gomes MD;  Location: [facility] OR;  Service: Obstetrics/Gynecology   • BILATERAL OOPHORECTOMY     • BLADDER REPAIR  2001   • CARPAL TUNNEL RELEASE  2019   • CHOLECYSTECTOMY  2005   • COLONOSCOPY  2011   • COLONOSCOPY  05/25/2021   • COLONOSCOPY N/A 06/14/2022    Procedure: COLONOSCOPY WITH BIOPSY;  Surgeon: Mario Alberto Mittal MD;  Location: [facility] ENDOSCOPY;  Service: Gastroenterology;  Laterality: N/A;   • CYST REMOVAL      POSTERIOR UPPER LEFT THIGH AND LEFT SIDE OF NECK   • CYSTOSCOPY N/A 08/28/2018    Procedure: CYSTOSCOPY;  Surgeon: Teressa Gomes MD;  Location: [facility] OR;  Service: Obstetrics/Gynecology   • ELBOW ARTHROSCOPY Right 1996   • ENDOSCOPY  5 years ago   • EYE SURGERY Bilateral     CATARACT EXTRACTIONS   • GALLBLADDER SURGERY  2006   • HYSTERECTOMY  2002   • KNEE SURGERY      REPORTS 3 SURGERIES LEFT KNEE, 1 SURGERY RIGHT KNEE   • MOUTH SURGERY  04/2022   • SKIN BIOPSY      REPORTS SKIN CANCER REMOVED FROM NOSE   • TUBAL ABDOMINAL LIGATION     • UPPER GASTROINTESTINAL ENDOSCOPY  2007   • WISDOM TOOTH EXTRACTION      EXTRACTIONS ON THE LEFT SIDE (TOP AND BOTTOM)     Family History   Problem Relation Age of Onset   • Ovarian cancer Mother    • Diabetes Mother    • Heart disease Mother    • Cancer Mother    • Vision loss Mother    • Osteoporosis Sister    • Early death Sister         Heart   • Diabetes Brother    • Heart disease Brother    • Stroke Brother    • Colon polyps Brother    • Diabetes Brother    • Colon cancer Neg Hx    • Rectal cancer Neg Hx    • Stomach cancer Neg Hx    • Liver cancer Neg Hx      Social History     Socioeconomic History   • Marital status:    Tobacco Use   • Smoking status: Never   • Smokeless tobacco: Never   Vaping Use   • Vaping Use: Never used   Substance and Sexual Activity   • Alcohol use: No   • Drug use: Yes  "    Frequency: 7.0 times per week     Types: Marijuana     Comment: for fms   • Sexual activity: Yes     Partners: Male     Birth control/protection: Tubal ligation, Hysterectomy     Comment: total hysteroctomy       Physical Examination:  Vital Signs: /62   Ht 160 cm (63\")   Wt 54.9 kg (121 lb)   BMI 21.43 kg/m²     General Appearance: alert, appears stated age, and cooperative  Breasts: Not performed  Abdomen: no masses, no hepatomegaly, no splenomegaly, soft non-tender, no guarding, and no rebound tenderness  Pelvic: Clinical staff was present for exam  External genitalia:  normal appearance of the external genitalia including Bartholin's and Arimo's glands.  :  urethral meatus normal;  Vaginal:  atrophic mucosal changes are present;   Positive tenderness at the vaginal apex.  No palpable masses on examination.  Cervix:  absent.  Uterus:  absent.  Adnexa:  non palpable bilaterally.    Data Review:  The following data was reviewed by: Teressa Gomes MD on 05/04/2023:     Labs:  TISSUE EXAM, [facility] ([facility]) (06/14/2022 12:47)    Imaging:  MR thoracic spine without IV contrast (12/06/2022 11:20)  MRI outside films (03/07/2023 10:55)    Medical Records:  COLONOSCOPY (06/14/2022 12:41)      Assessment and Plan   1. Dyspareunia in female  Patient with dyspareunia as noted.  I have discussed with the patient the need for use of water-based or silicone based lubricants.  I have discussed with the patient as well other alternatives for management of her atrophic vaginitis.    2. Right lower quadrant pain  Patient with right lower quadrant pain as noted.  She has been diagnosed with colitis.  She has not had any recent imaging.  If continued symptoms patient may need CT scan of abdomen and pelvis.    3. Colitis  Patient with lymphocytic colitis as noted.  She is to follow-up with GI as discussed.  She did have colonoscopy with biopsies as noted.    4. Postmenopausal atrophic vaginitis  I have discussed " with the patient the various options for management of her atrophic vaginitis.  Patient desires a trial with Estring as noted.  Instructions and precautions are given.  She is to follow-up as discussed.    Follow Up/Instructions:  Follow up as noted.  Patient was given instructions and counseling regarding her condition or for health maintenance advice. Please see specific information pulled into the AVS if appropriate.     Note: Speech recognition transcription software may have been used to dictate portions of this document.  An attempt at proofreading has been made though minor errors in transcription may still be present.    This note was electronically signed.  Teressa Gomes M.D.

## 2023-05-22 ENCOUNTER — OFFICE VISIT (OUTPATIENT)
Dept: GASTROENTEROLOGY | Facility: CLINIC | Age: 73
End: 2023-05-22
Payer: MEDICARE

## 2023-05-22 VITALS
SYSTOLIC BLOOD PRESSURE: 120 MMHG | BODY MASS INDEX: 21.97 KG/M2 | TEMPERATURE: 98 F | DIASTOLIC BLOOD PRESSURE: 65 MMHG | WEIGHT: 124 LBS | HEIGHT: 63 IN | HEART RATE: 65 BPM

## 2023-05-22 DIAGNOSIS — K52.832 LYMPHOCYTIC COLITIS: Primary | ICD-10-CM

## 2023-05-22 DIAGNOSIS — K58.0 IRRITABLE BOWEL SYNDROME WITH DIARRHEA: ICD-10-CM

## 2023-05-22 DIAGNOSIS — K21.9 GASTROESOPHAGEAL REFLUX DISEASE WITHOUT ESOPHAGITIS: ICD-10-CM

## 2023-05-22 PROCEDURE — 1159F MED LIST DOCD IN RCRD: CPT | Performed by: INTERNAL MEDICINE

## 2023-05-22 PROCEDURE — 1160F RVW MEDS BY RX/DR IN RCRD: CPT | Performed by: INTERNAL MEDICINE

## 2023-05-22 PROCEDURE — 99213 OFFICE O/P EST LOW 20 MIN: CPT | Performed by: INTERNAL MEDICINE

## 2023-05-22 RX ORDER — DONEPEZIL HYDROCHLORIDE 5 MG/1
5 TABLET, ORALLY DISINTEGRATING ORAL DAILY
Qty: 30 TABLET | Refills: 11 | COMMUNITY
Start: 2023-05-04 | End: 2024-05-03

## 2023-05-22 NOTE — PROGRESS NOTES
"     Follow Up Note     Date: 2023   Patient Name: Klaudia Saez  MRN: [id number]  : 1950     Referring Physician: Trena Arias MD    Chief Complaint:    Chief Complaint   Patient presents with   • Heartburn   • IBS-D   • Lymphocytic colitis       Interval History:   2023  Klaudia Saez is a 72 y.o. female who is here today for follow up for her change in bowel habit and lymphocytic colitis.  She states that she is doing pretty well now with the bismuth subsalicylate and as needed Imodium.  No significant abdominal pain no diarrhea.    2021  She has a history of diarrhea for many years with 1-5 loose to watery bowel movements per day. She has occasional incontinence of bowels and she is not aware it has happened. She has urgency associated with bowel movements. Eating makes symptoms worse.  No abdominal pain. No rectal bleeding. She has occasional vomiting during a bowel movement, but this is unchanged.  Her last colonoscopy was about 3 months ago at [facility], she was told everything was \"normal\". Results are not available.     She has a history of severe reflux. She is taking Omeprazole 20 mg daily with reasonable control of reflux. No difficulty swallowing.  There is no family history of GI malignancy or IBD.      Subjective      Past Medical History:   Past Medical History:   Diagnosis Date   • Arthritis     REPORTS BOTHERSOME IN HER NECK   • Back pain     foraminal narrowing   • Body piercing     EARS   • Cancer     SKIN (REMOVED FROM NOSE)   • Elevated cholesterol    • Fibromyalgia    • Functional diarrhea    • GERD (gastroesophageal reflux disease)    • H/O bone density study    • H/O exercise stress test     REPORTS APPROXIMATELY  AND THAT ALL WAS WNL'S   • H/O mammogram 2016   • Headache    • Hearing loss     REPORTS MINIMAL AND ONLY WITH CERTAIN FREQUENCIES. NO HEARING AIDS   • History of bronchitis    • History of TMJ syndrome    • " Hyperlipemia    • IBS (irritable bowel syndrome)    • Low back pain 9/2022   • Rectocele    • Seasonal allergies    • TB (tuberculosis)     REPORTS +TB SKIN TEST IN THE 80'S.  REPORTS SHE HAD TREATMENT FOR 1 YEAR BUT NO ACTIVE DISEASE.    • Tennis elbow    • Wears glasses     NON RX FOR READING     Past Surgical History:   Past Surgical History:   Procedure Laterality Date   • ANTERIOR AND POSTERIOR VAGINAL REPAIR N/A 08/28/2018    Procedure: ANTERIOR AND POSTERIOR VAGINAL REPAIR, ENTEROCELE REPAIR;  Surgeon: Teressa Gomse MD;  Location: [facility] OR;  Service: Obstetrics/Gynecology   • BILATERAL OOPHORECTOMY     • BLADDER REPAIR  2001   • CARPAL TUNNEL RELEASE  2019   • CHOLECYSTECTOMY  2005   • COLONOSCOPY  2011   • COLONOSCOPY  05/25/2021   • COLONOSCOPY N/A 06/14/2022    Procedure: COLONOSCOPY WITH BIOPSY;  Surgeon: Mario Alberto Mittal MD;  Location: [facility] ENDOSCOPY;  Service: Gastroenterology;  Laterality: N/A;   • CYST REMOVAL      POSTERIOR UPPER LEFT THIGH AND LEFT SIDE OF NECK   • CYSTOSCOPY N/A 08/28/2018    Procedure: CYSTOSCOPY;  Surgeon: Teressa Gomes MD;  Location: [facility] OR;  Service: Obstetrics/Gynecology   • ELBOW ARTHROSCOPY Right 1996   • ENDOSCOPY  5 years ago   • EYE SURGERY Bilateral     CATARACT EXTRACTIONS   • GALLBLADDER SURGERY  2006   • HYSTERECTOMY  2002   • KNEE SURGERY      REPORTS 3 SURGERIES LEFT KNEE, 1 SURGERY RIGHT KNEE   • MOUTH SURGERY  04/2022   • SKIN BIOPSY      REPORTS SKIN CANCER REMOVED FROM NOSE   • TUBAL ABDOMINAL LIGATION     • UPPER GASTROINTESTINAL ENDOSCOPY  2007   • WISDOM TOOTH EXTRACTION      EXTRACTIONS ON THE LEFT SIDE (TOP AND BOTTOM)       Family History:   Family History   Problem Relation Age of Onset   • Ovarian cancer Mother    • Diabetes Mother    • Heart disease Mother    • Cancer Mother    • Vision loss Mother    • Osteoporosis Sister    • Early death Sister         Heart   • Diabetes Brother    • Heart disease Brother    • Stroke Brother    • Colon  polyps Brother    • Diabetes Brother    • Colon cancer Neg Hx    • Rectal cancer Neg Hx    • Stomach cancer Neg Hx    • Liver cancer Neg Hx        Social History:   Social History     Socioeconomic History   • Marital status:    Tobacco Use   • Smoking status: Never   • Smokeless tobacco: Never   Vaping Use   • Vaping Use: Never used   Substance and Sexual Activity   • Alcohol use: No   • Drug use: Yes     Frequency: 7.0 times per week     Types: Marijuana     Comment: for fms   • Sexual activity: Defer       Medications:     Current Outpatient Medications:   •  Ascorbic Acid (VITAMIN C GUMMIE PO), Take 1 tablet by mouth Daily., Disp: , Rfl:   •  atorvastatin (LIPITOR) 80 MG tablet, Take 1 tablet by mouth Daily., Disp: , Rfl:   •  Bismuth Subsalicylate 262 MG tablet, Take 262 mg by mouth 2 (Two) Times a Day., Disp: 60 each, Rfl: 5  •  cholecalciferol (VITAMIN D3) 1000 units tablet, Take 1 tablet by mouth Daily., Disp: , Rfl:   •  Diclofenac Sodium (VOLTAREN) 1 % gel gel, , Disp: , Rfl:   •  donepezil ODT (ARICEPT ODT) 5 MG disintegrating tablet, Take 1 tablet by mouth Daily., Disp: 30 tablet, Rfl: 11  •  DULoxetine (CYMBALTA) 60 MG capsule, Take 1 capsule by mouth Daily., Disp: 30 capsule, Rfl: 2  •  ESTRADIOL TD, Place  on the skin as directed by provider. Vaginal cream, Disp: , Rfl:   •  gabapentin (NEURONTIN) 300 MG capsule, Take 1 capsule by mouth As Needed. PRN, Disp: , Rfl:   •  levocetirizine (XYZAL) 5 MG tablet, Take 1 tablet by mouth Every Evening., Disp: , Rfl:   •  MULTIPLE VITAMIN PO, Take 1 tablet by mouth Daily., Disp: , Rfl:   •  NALTREXONE HCL PO, , Disp: , Rfl:   •  omeprazole (priLOSEC) 20 MG capsule, Take 1 capsule by mouth Daily., Disp: , Rfl:   •  tiZANidine (ZANAFLEX) 4 MG tablet, Take 1 tablet by mouth At Night As Needed for Muscle Spasms., Disp: , Rfl:   •  vitamin B-12 (CYANOCOBALAMIN) 1000 MCG tablet, Take 1 tablet by mouth Daily., Disp: , Rfl:   •  dicyclomine (BENTYL) 20 MG tablet,  "Take 1 tablet by mouth 2 (Two) Times a Day As Needed (Abdominal pain). (Patient not taking: Reported on 5/22/2023), Disp: 60 tablet, Rfl: 2  •  estradiol (ESTRING) 2 MG vaginal ring, Insert one ring into the vagina every three months. (Patient not taking: Reported on 5/22/2023), Disp: 1 each, Rfl: 4    Allergies:   Allergies   Allergen Reactions   • Prednisone Other (See Comments)     REPORTS CAUSED VISUAL DISTURBANCE (ACUTE BLINDNESS) AND HYPOGLYCEMIA.  REPORTS SHE IS ABLE TO TAKE STEROID INJECTIONS, BUT THAT SHE IS NOT ABLE TO TOLERATE ORAL DOSES FOR PROLONGED PERIODS OF TIME.  Other reaction(s): Blindness - both eyes  1oral prednisone only,, after multiple doses  REPORTS CAUSED VISUAL DISTURBANCE (ACUTE BLINDNESS) AND HYPOGLYCEMIA.  REPORTS SHE IS ABLE TO TAKE STEROID INJECTIONS, BUT THAT SHE IS NOT ABLE TO TOLERATE ORAL DOSES FOR PROLONGED PERIODS OF TIME.   • Nsaids Unknown - High Severity   • Codeine Unknown - Low Severity     Other reaction(s): Unknown - Low Severity       Review of Systems:   Review of Systems   Constitutional: Negative for appetite change, fatigue, fever and unexpected weight loss.   HENT: Negative for trouble swallowing.    Gastrointestinal: Negative for abdominal distention, abdominal pain, anal bleeding, blood in stool, constipation, diarrhea, nausea, rectal pain, vomiting, GERD and indigestion.       The following portions of the patient's history were reviewed and updated as appropriate: allergies, current medications, past family history, past medical history, past social history, past surgical history and problem list.    Objective     Physical Exam:  Vital Signs:   Vitals:    05/22/23 1524   BP: 120/65   Pulse: 65   Temp: 98 °F (36.7 °C)   TempSrc: Infrared   Weight: 56.2 kg (124 lb)   Height: 160 cm (63\")  Comment: patient states       Physical Exam  Constitutional:       Appearance: Normal appearance.   HENT:      Head: Normocephalic and atraumatic.   Eyes:      " Conjunctiva/sclera: Conjunctivae normal.   Abdominal:      General: Abdomen is flat. There is no distension.      Palpations: There is no mass.      Tenderness: There is no abdominal tenderness. There is no guarding or rebound.      Hernia: No hernia is present.   Musculoskeletal:      Cervical back: Normal range of motion and neck supple.   Neurological:      Mental Status: She is alert.         Results Review:   I reviewed the patient's new clinical results.    No visits with results within 90 Day(s) from this visit.   Latest known visit with results is:   Admission on 2022, Discharged on 2022   Component Date Value Ref Range Status   • Reference Lab Report 2022    Final                    Value:[facility]  [address] [place] [address]  Phone: [phone] or [phone]  Fax: [phone]  Gabriel Morley M.D., Medical Director    PATIENT NAME                           LABORATORY NO.  CRISTAL SWEENEY.                    [id number]  [id number]                         AGE              SEX  SSN           CLIENT REF #  [facility]            71      1950  F    [id number]   [id number]    [address]                REQUESTING M.D.     ATTENDING M.D.     COPY TO.  [address]                        [place] [id number]                 SHREYA YOUNG  DATE COLLECTED      DATE RECEIVED      DATE REPORTED  2022          2022         06/15/2022    DIAGNOSIS:  A.   TERMINAL ILEUM BIOPSY:  Ileal-type mucosa with no significant pathologic abnormalities  Negative for acute inflammation, granulomas and dysplasia  B.   RANDOM COLON BIOPSIES, LEFT, RIGHT, AND                           TRANSVERSE:  Lymphocytic colitis (see comment)    COMMENT:  The histologic sections show colorectal-type mucosa with dramatically increased  intraepithelial lymphocytes and associated reactive  "epithelial changes including  mucin depletion. There is no significant thickening of the subepithelial collagen  table. Although there are scattered intraepithelial neutrophils, this is not the  predominant finding. Features of chronicity (including a basal  lymphoplasmacytosis) are not prominent.    Overall, these findings are compatible with lymphocytic colitis. However, an  expanding list of medications is implicated in lymphocytic-pattern colitis  (including NSAIDs, losartan, and ranitidine, among many others). Additionally,  some infections can cause a lymphocytic-pattern colitis. As such, clinical and  endoscopic correlation is recommended.    CLINICAL HISTORY:  Diarrhea, unspecified type    SPECIMENS RECEIVED:  A.  TERMINAL ILEUM BIOPSY  B.  RANDOM COLON BIOPSIES , LEFT, RIGHT, AND                           TRANSVERSE    MICROSCOPIC DESCRIPTION:  Tissue blocks are prepared and slides are examined microscopically on all  specimens. See diagnosis for details.    Professional interpretation rendered by Chayo Draper D.O.,JOAQUIN at [facility] [employer], [address] [place] [address].    GROSS DESCRIPTION:  A.  Specimen is received in 1 formalin filled container labeled \"terminal ileum  biopsy\" and consists of a single portion of tan soft tissue measuring 0.3 x  0.2 x 0.2 cm.  The specimen is submitted entirely in 1 cassette.  B.    Specimen is received in 1 formalin filled container labeled \"colon  biopsies\" and consists of multiple portions of tan soft tissue measuring 0.6  x 0.4 x 0.1 cm.  The specimen is filtered and submitted entirely in 1  cassette.    REVIEWED, DIAGNOSED AND ELECTRONICALLY  SIGNED BY:    Chayo Draper D.O.,[email or web address].  CPT CODES:  88305x2        No radiology results for the last 90 days.    Assessment / Plan      1.  Microscopic colitis (suspected NSAID/PPI induced)  2.  Suspected underlying irritable bowel with diarrhea  3.  Gastroesophageal reflux disease without " esophagitis  5/22/2023  She could not stay away from PPI and had to be put back on a PPI due to her significant rebound reflux symptoms.  Not on any NSAIDs anymore.  She has responded well for bismuth subsalicylate currently taking 1 tablet twice daily along with Imodium as needed.  She has been having bowel movement once daily or every other day now.  Lab studies done in May 2023 revealed and her CBC CMP unremarkable.    Continue bismuth BID,  Continue imodium prn   As per patient pot is helping her most for her body pain  Continue low-dose PPI    2/14/2023  Patient has a longstanding history of loose stools, diffuse abdominal pain, nausea.  She had a gallbladder removed many years ago for the same symptoms.  Patient also has a fibromyalgia.  She is also smoking marijuana regularly. As per patient she had a trial of colestipol and cholestyramine which did not help.  She stopped taking NSAIDs now.  She was prescribed budesonide 8-week course but patient states that she may not have taken that one.  She changed her diet as well and cut down her marijuana use.  She states that [facility] IBS CBT therapy helped her.  Colonoscopy done on 6/14/2022 did not reveal any endoscopic signs of colitis or ileitis.  Random colon biopsies consistent with lymphocytic colitis and TI biopsy was normal. Her celiac serology was negative.  Fecal calprotectin and fecal elastase was normal.  Recent stool studies including stool C. difficile and GI panel negative.    Prior history  4. Personal history of colon polyps  Colonoscopy done 6/14/2022 did not reveal any colon polyps.  Recommend screening colonoscopy in 2032 in 10 years time  She had colonoscopy dated 5/25/2021 per Dr. Serna that was unremarkable, no specimens collected.   Colonoscopy in 2018 polyps removed however pathology was hyperplastic           Follow Up:   No follow-ups on file.    Mario Alberto Mittal MD  [facility] [place]  5/22/2023  15:26 EDT     Please note that  portions of this note may have been completed with a voice recognition program.

## 2023-05-23 ENCOUNTER — OFFICE VISIT (OUTPATIENT)
Dept: NEUROSURGERY | Facility: CLINIC | Age: 73
End: 2023-05-23
Payer: MEDICARE

## 2023-05-23 ENCOUNTER — TELEPHONE (OUTPATIENT)
Dept: NEUROSURGERY | Facility: CLINIC | Age: 73
End: 2023-05-23

## 2023-05-23 VITALS
HEIGHT: 63 IN | DIASTOLIC BLOOD PRESSURE: 60 MMHG | BODY MASS INDEX: 22.15 KG/M2 | WEIGHT: 125 LBS | TEMPERATURE: 97.5 F | SYSTOLIC BLOOD PRESSURE: 100 MMHG

## 2023-05-23 DIAGNOSIS — G89.29 CHRONIC MIDLINE POSTERIOR NECK PAIN: ICD-10-CM

## 2023-05-23 DIAGNOSIS — D18.09 HEMANGIOMA OF BONE: ICD-10-CM

## 2023-05-23 DIAGNOSIS — G89.4 CHRONIC PAIN SYNDROME: Primary | ICD-10-CM

## 2023-05-23 DIAGNOSIS — M54.2 CHRONIC MIDLINE POSTERIOR NECK PAIN: ICD-10-CM

## 2023-05-23 DIAGNOSIS — M47.812 CERVICAL SPONDYLOSIS WITHOUT MYELOPATHY: ICD-10-CM

## 2023-05-23 DIAGNOSIS — M54.6 PAIN IN THORACIC SPINE: ICD-10-CM

## 2023-05-23 NOTE — PROGRESS NOTES
Klaudia Saez   1950   [id number]       05/23/2023     Chief Complaint   Patient presents with   • Follow-up     Back pain     • Neck Pain   neck pain and stiffness     HPI   This 72-year-old female was seen on 3/7/2023 with midthoracic pain and pain that would radiate up to her shoulder blade.  At her last visit I reviewed her thoracic MRI scan which did not show evidence of cord compression, hemangiomas were noted.  She presents today with a cervical MRI for review.  Patient is participating with her chiropractic therapies.  She reports today that she has a lot of neck stiffness and its difficult to turn her head completely from side to side.      Chronic Illnesses:  Osteoarthritis  Past Medical History:  No date: Arthritis      Comment:  REPORTS BOTHERSOME IN HER NECK  No date: Back pain      Comment:  foraminal narrowing  No date: Body piercing      Comment:  EARS  No date: Cancer      Comment:  SKIN (REMOVED FROM NOSE)  No date: Elevated cholesterol  No date: Fibromyalgia  No date: Functional diarrhea  No date: GERD (gastroesophageal reflux disease)  2015: H/O bone density study  No date: H/O exercise stress test      Comment:  REPORTS APPROXIMATELY 2014 AND THAT ALL WAS WNL'S  06/25/2016: H/O mammogram  No date: Headache  No date: Hearing loss      Comment:  REPORTS MINIMAL AND ONLY WITH CERTAIN FREQUENCIES. NO                HEARING AIDS  No date: History of bronchitis  No date: History of TMJ syndrome  No date: Hyperlipemia  No date: IBS (irritable bowel syndrome)  9/2022: Low back pain  No date: Rectocele  No date: Seasonal allergies  No date: TB (tuberculosis)      Comment:  REPORTS +TB SKIN TEST IN THE 80'S.  REPORTS SHE HAD                TREATMENT FOR 1 YEAR BUT NO ACTIVE DISEASE.   No date: Tennis elbow  No date: Wears glasses      Comment:  NON RX FOR READING     Past Surgical History:   Procedure Laterality Date   • ANTERIOR AND POSTERIOR VAGINAL REPAIR N/A 08/28/2018    Procedure: ANTERIOR  AND POSTERIOR VAGINAL REPAIR, ENTEROCELE REPAIR;  Surgeon: Teressa Gomes MD;  Location: [facility] OR;  Service: Obstetrics/Gynecology   • BILATERAL OOPHORECTOMY     • BLADDER REPAIR  2001   • CARPAL TUNNEL RELEASE  2019   • CHOLECYSTECTOMY  2005   • COLONOSCOPY  2011   • COLONOSCOPY  05/25/2021   • COLONOSCOPY N/A 06/14/2022    Procedure: COLONOSCOPY WITH BIOPSY;  Surgeon: Mario Alberto Mittal MD;  Location: [facility] ENDOSCOPY;  Service: Gastroenterology;  Laterality: N/A;   • CYST REMOVAL      POSTERIOR UPPER LEFT THIGH AND LEFT SIDE OF NECK   • CYSTOSCOPY N/A 08/28/2018    Procedure: CYSTOSCOPY;  Surgeon: Teressa Gomes MD;  Location: [facility] OR;  Service: Obstetrics/Gynecology   • ELBOW ARTHROSCOPY Right 1996   • ENDOSCOPY  5 years ago   • EYE SURGERY Bilateral     CATARACT EXTRACTIONS   • GALLBLADDER SURGERY  2006   • HYSTERECTOMY  2002   • KNEE SURGERY      REPORTS 3 SURGERIES LEFT KNEE, 1 SURGERY RIGHT KNEE   • MOUTH SURGERY  04/2022   • SKIN BIOPSY      REPORTS SKIN CANCER REMOVED FROM NOSE   • TUBAL ABDOMINAL LIGATION     • UPPER GASTROINTESTINAL ENDOSCOPY  2007   • WISDOM TOOTH EXTRACTION      EXTRACTIONS ON THE LEFT SIDE (TOP AND BOTTOM)        Allergies   Allergen Reactions   • Prednisone Other (See Comments)     REPORTS CAUSED VISUAL DISTURBANCE (ACUTE BLINDNESS) AND HYPOGLYCEMIA.  REPORTS SHE IS ABLE TO TAKE STEROID INJECTIONS, BUT THAT SHE IS NOT ABLE TO TOLERATE ORAL DOSES FOR PROLONGED PERIODS OF TIME.  Other reaction(s): Blindness - both eyes  1oral prednisone only,, after multiple doses  REPORTS CAUSED VISUAL DISTURBANCE (ACUTE BLINDNESS) AND HYPOGLYCEMIA.  REPORTS SHE IS ABLE TO TAKE STEROID INJECTIONS, BUT THAT SHE IS NOT ABLE TO TOLERATE ORAL DOSES FOR PROLONGED PERIODS OF TIME.   • Nsaids Unknown - High Severity   • Codeine Unknown - Low Severity     Other reaction(s): Unknown - Low Severity          Current Outpatient Medications:   •  Ascorbic Acid (VITAMIN C GUMMIE PO), Take 1 tablet by mouth  Daily., Disp: , Rfl:   •  atorvastatin (LIPITOR) 80 MG tablet, Take 1 tablet by mouth Daily., Disp: , Rfl:   •  Bismuth Subsalicylate 262 MG tablet, Take 262 mg by mouth 2 (Two) Times a Day., Disp: 60 each, Rfl: 5  •  cholecalciferol (VITAMIN D3) 1000 units tablet, Take 1 tablet by mouth Daily., Disp: , Rfl:   •  Diclofenac Sodium (VOLTAREN) 1 % gel gel, , Disp: , Rfl:   •  dicyclomine (BENTYL) 20 MG tablet, Take 1 tablet by mouth 2 (Two) Times a Day As Needed (Abdominal pain)., Disp: 60 tablet, Rfl: 2  •  donepezil ODT (ARICEPT ODT) 5 MG disintegrating tablet, Take 1 tablet by mouth Daily., Disp: 30 tablet, Rfl: 11  •  DULoxetine (CYMBALTA) 60 MG capsule, Take 1 capsule by mouth Daily., Disp: 30 capsule, Rfl: 2  •  estradiol (ESTRING) 2 MG vaginal ring, Insert one ring into the vagina every three months., Disp: 1 each, Rfl: 4  •  ESTRADIOL TD, Place  on the skin as directed by provider. Vaginal cream, Disp: , Rfl:   •  gabapentin (NEURONTIN) 300 MG capsule, Take 1 capsule by mouth As Needed. PRN, Disp: , Rfl:   •  levocetirizine (XYZAL) 5 MG tablet, Take 1 tablet by mouth Every Evening., Disp: , Rfl:   •  MULTIPLE VITAMIN PO, Take 1 tablet by mouth Daily., Disp: , Rfl:   •  NALTREXONE HCL PO, , Disp: , Rfl:   •  omeprazole (priLOSEC) 20 MG capsule, Take 1 capsule by mouth Daily., Disp: , Rfl:   •  tiZANidine (ZANAFLEX) 4 MG tablet, Take 1 tablet by mouth At Night As Needed for Muscle Spasms., Disp: , Rfl:   •  vitamin B-12 (CYANOCOBALAMIN) 1000 MCG tablet, Take 1 tablet by mouth Daily., Disp: , Rfl:      Social History     Socioeconomic History   • Marital status:    Tobacco Use   • Smoking status: Never   • Smokeless tobacco: Never   Vaping Use   • Vaping Use: Never used   Substance and Sexual Activity   • Alcohol use: No   • Drug use: Yes     Frequency: 7.0 times per week     Types: Marijuana     Comment: for fms   • Sexual activity: Yes     Partners: Male     Comment: total hysteroctomy        family  "history includes Cancer in her mother; Colon polyps in her brother; Diabetes in her brother, brother, brother, and mother; Early death in her sister; Heart disease in her brother and mother; Osteoporosis in her sister; Ovarian cancer in her mother; Stroke in her brother; Vision loss in her mother.     Social History    Tobacco Use      Smoking status: Never      Smokeless tobacco: Never       Gait & Balance Assessment :  Risk assessment for falls. Fall precautions.  universal fall precautions, such as;   • Using gait aids a cane, walker at the appropriate height at all times for ambulation or if necessary a wheelchair  • Removing all area rugs and coffee tables to create a safe environment at home  • Ensure clean, dry floors  • Wearing supportive footwear and properly fitting clothing  • Ensure bed/chair is appropriate height and patient's feet can touch the floor  • Using a shower transfer bench  • Using walk-in shower and having shower safety bars installed  • Ensure proper lighting, minimize glare  • Have nightlights operational and in use  • Participation in an exercise program for gait training, balance training and strength  • Avoid carrying laundry up and down steps  • Ensure proper compliance and organization of medications to avoid errors   • Avoid use of over the counter sedatives and alcohol consumption  • Ensure easy access to call bell, glasses, TV control, telephone  • Ensure glasses/hearing aids are in use or close by (on top of night table)     Body mass index is 22.14 kg/m².   BMI is within normal parameters. No other follow-up for BMI required.       /60 (BP Location: Right arm, Patient Position: Sitting, Cuff Size: Adult)   Temp 97.5 °F (36.4 °C) (Infrared)   Ht 160 cm (63\")   Wt 56.7 kg (125 lb)   LMP  (LMP Unknown)   BMI 22.14 kg/m²    Physical Examination:  HEENT-wnl  Lungs-No wheezing or SOB      Neurologic Exam   Patient is alert and oriented.  Pupils are equal and " reactive.  Visual fields are full.  EOMI without nystagmus.    Gait is within normal limits, no difficulties with balance.  Reflexes intact.  No focal weakness in the upper or lower extremities.  She does have some decreased range of motion of the cervical spine with lateral rotation.  There is mild discomfort in palpation of the posterior cervical spine and cervical paraspinal musculature.    Radiological Data Review:  I have personally reviewed the cervical MRI from 5/12/2023 done at [facility].  This reveals mild degenerative disc throughout the cervical spine.  There is no evidence of cord compression or nerve root compression.      Assessment and Plan:  Diagnoses and all orders for this visit:    1. Chronic pain syndrome (Primary)    2. Cervical spondylosis without myelopathy    3. Pain in thoracic spine    4. Chronic midline posterior neck pain    5. Hemangioma of bone         I spent 30 minutes caring for Klaudia Saez on 05/23/23.  This time includes activities preparing for the visit, reviewing test, reviewing history and performing an appropriate examination.  Discussing with the patient and reviewing and independently interpreting the diagnostic studies, communicating that information to the patient along with discussing care coordination and referrals if needed and documenting information in the medical records.    Any copied data from previous notes included in the (1) HPI, (2) PE, (3) MDM and/or assessment and plan has been reviewed and is accurate as of this date.  Trupti Cordoba, HOLLIE      PCP:  Trena Arias MD

## 2023-05-31 ENCOUNTER — TELEPHONE (OUTPATIENT)
Dept: OBSTETRICS AND GYNECOLOGY | Facility: CLINIC | Age: 73
End: 2023-05-31

## 2023-05-31 RX ORDER — ESTRADIOL 0.1 MG/G
CREAM VAGINAL
Qty: 1 EACH | Refills: 11 | Status: SHIPPED | OUTPATIENT
Start: 2023-05-31

## 2023-05-31 NOTE — TELEPHONE ENCOUNTER
Patient is having trouble getting Estring approved with her insurance and is requesting a RX for vaginal cream in the meantime     Thank You

## 2023-06-01 ENCOUNTER — TELEPHONE (OUTPATIENT)
Dept: OBSTETRICS AND GYNECOLOGY | Facility: CLINIC | Age: 73
End: 2023-06-01

## 2023-11-27 ENCOUNTER — OFFICE VISIT (OUTPATIENT)
Dept: GASTROENTEROLOGY | Facility: CLINIC | Age: 73
End: 2023-11-27
Payer: MEDICARE

## 2023-11-27 VITALS
WEIGHT: 127 LBS | SYSTOLIC BLOOD PRESSURE: 97 MMHG | DIASTOLIC BLOOD PRESSURE: 60 MMHG | BODY MASS INDEX: 22.5 KG/M2 | HEIGHT: 63 IN | HEART RATE: 66 BPM | TEMPERATURE: 98.2 F

## 2023-11-27 DIAGNOSIS — K52.832 LYMPHOCYTIC COLITIS: Primary | ICD-10-CM

## 2023-11-27 DIAGNOSIS — K21.9 GASTROESOPHAGEAL REFLUX DISEASE WITHOUT ESOPHAGITIS: ICD-10-CM

## 2023-11-27 PROCEDURE — 1159F MED LIST DOCD IN RCRD: CPT | Performed by: INTERNAL MEDICINE

## 2023-11-27 PROCEDURE — 99213 OFFICE O/P EST LOW 20 MIN: CPT | Performed by: INTERNAL MEDICINE

## 2023-11-27 PROCEDURE — 1160F RVW MEDS BY RX/DR IN RCRD: CPT | Performed by: INTERNAL MEDICINE

## 2023-11-27 NOTE — PROGRESS NOTES
"     Follow Up Note     Date: 2023   Patient Name: Klaudia Saez  MRN: [id number]  : 1950     Referring Physician: Trena Arias MD    Chief Complaint:    Chief Complaint   Patient presents with    Lymphocytic colitis    Heartburn    IBS-D       Interval History:   2023  Klaudia Saez is a 73 y.o. female who is here today for follow up for her lymphocytic colitis.  She states that she is more or less doing same as before with occasional episodes of loose stools and fecal urgency.  She is currently taking bismuth twice daily and Imodium as needed    2021  She has a history of diarrhea for many years with 1-5 loose to watery bowel movements per day. She has occasional incontinence of bowels and she is not aware it has happened. She has urgency associated with bowel movements. Eating makes symptoms worse.  No abdominal pain. No rectal bleeding. She has occasional vomiting during a bowel movement, but this is unchanged.  Her last colonoscopy was about 3 months ago at [facility], she was told everything was \"normal\". Results are not available.     She has a history of severe reflux. She is taking Omeprazole 20 mg daily with reasonable control of reflux. No difficulty swallowing.  There is no family history of GI malignancy or IBD.     Subjective      Past Medical History:   Past Medical History:   Diagnosis Date    Arthritis     REPORTS BOTHERSOME IN HER NECK    Back pain     foraminal narrowing    Body piercing     EARS    Cancer     SKIN (REMOVED FROM NOSE)    Elevated cholesterol     Fibromyalgia     Functional diarrhea     GERD (gastroesophageal reflux disease)     H/O bone density study     H/O exercise stress test     REPORTS APPROXIMATELY 2014 AND THAT ALL WAS WNL'S    H/O mammogram 2016    Headache     Hearing loss     REPORTS MINIMAL AND ONLY WITH CERTAIN FREQUENCIES. NO HEARING AIDS    History of bronchitis     History of TMJ syndrome     Hyperlipemia  "    IBS (irritable bowel syndrome)     Low back pain 9/2022    Pre-diabetes     Rectocele     Seasonal allergies     TB (tuberculosis)     REPORTS +TB SKIN TEST IN THE 80'S.  REPORTS SHE HAD TREATMENT FOR 1 YEAR BUT NO ACTIVE DISEASE.     Tennis elbow     Wears glasses     NON RX FOR READING     Past Surgical History:   Past Surgical History:   Procedure Laterality Date    ANTERIOR AND POSTERIOR VAGINAL REPAIR N/A 08/28/2018    Procedure: ANTERIOR AND POSTERIOR VAGINAL REPAIR, ENTEROCELE REPAIR;  Surgeon: Teressa Gomes MD;  Location: [facility] OR;  Service: Obstetrics/Gynecology    BILATERAL OOPHORECTOMY      BLADDER REPAIR  2001    CARPAL TUNNEL RELEASE  2019    CHOLECYSTECTOMY  2005    COLONOSCOPY  2011    COLONOSCOPY  05/25/2021    COLONOSCOPY N/A 06/14/2022    Procedure: COLONOSCOPY WITH BIOPSY;  Surgeon: Mario Alberto Mittal MD;  Location: [facility] ENDOSCOPY;  Service: Gastroenterology;  Laterality: N/A;    CYST REMOVAL      POSTERIOR UPPER LEFT THIGH AND LEFT SIDE OF NECK    CYSTOSCOPY N/A 08/28/2018    Procedure: CYSTOSCOPY;  Surgeon: Teressa Gomes MD;  Location: [facility] OR;  Service: Obstetrics/Gynecology    ELBOW ARTHROSCOPY Right 1996    ENDOSCOPY  5 years ago    EYE SURGERY Bilateral     CATARACT EXTRACTIONS    GALLBLADDER SURGERY  2006    HYSTERECTOMY  2002    KNEE SURGERY      REPORTS 3 SURGERIES LEFT KNEE, 1 SURGERY RIGHT KNEE    MOUTH SURGERY  04/2022    SKIN BIOPSY      REPORTS SKIN CANCER REMOVED FROM NOSE    TUBAL ABDOMINAL LIGATION      UPPER GASTROINTESTINAL ENDOSCOPY  2007    WISDOM TOOTH EXTRACTION      EXTRACTIONS ON THE LEFT SIDE (TOP AND BOTTOM)       Family History:   Family History   Problem Relation Age of Onset    Ovarian cancer Mother     Diabetes Mother     Heart disease Mother     Cancer Mother         ovarian    Vision loss Mother     Osteoporosis Sister     Early death Sister         Heart    Diabetes Brother     Heart disease Brother     Stroke Brother     Colon polyps Brother      Diabetes Brother     Diabetes Brother     Colon cancer Neg Hx     Rectal cancer Neg Hx     Stomach cancer Neg Hx     Liver cancer Neg Hx        Social History:   Social History     Socioeconomic History    Marital status:    Tobacco Use    Smoking status: Never    Smokeless tobacco: Never   Vaping Use    Vaping Use: Never used   Substance and Sexual Activity    Alcohol use: No    Drug use: Yes     Frequency: 7.0 times per week     Types: Marijuana     Comment: for fibromyalgia    Sexual activity: Defer     Partners: Male     Birth control/protection: Hysterectomy       Medications:     Current Outpatient Medications:     Ascorbic Acid (VITAMIN C GUMMIE PO), Take 1 tablet by mouth Daily., Disp: , Rfl:     atorvastatin (LIPITOR) 80 MG tablet, Take 1 tablet by mouth Daily., Disp: , Rfl:     Bismuth Subsalicylate 262 MG tablet, Take 262 mg by mouth 2 (Two) Times a Day., Disp: 60 each, Rfl: 5    cholecalciferol (VITAMIN D3) 1000 units tablet, Take 1 tablet by mouth Daily., Disp: , Rfl:     dicyclomine (BENTYL) 20 MG tablet, Take 1 tablet by mouth 2 (Two) Times a Day As Needed (Abdominal pain)., Disp: 60 tablet, Rfl: 2    donepezil ODT (ARICEPT ODT) 5 MG disintegrating tablet, Take 1 tablet by mouth Daily., Disp: 30 tablet, Rfl: 11    DULoxetine (CYMBALTA) 60 MG capsule, Take 1 capsule by mouth Daily., Disp: 30 capsule, Rfl: 2    ELDERBERRY PO, Take  by mouth., Disp: , Rfl:     estradiol (ESTRACE VAGINAL) 0.1 MG/GM vaginal cream, Use 0.5 grams intravaginally twice weekly to control symptoms., Disp: 1 each, Rfl: 11    gabapentin (NEURONTIN) 600 MG tablet, Take 1 tablet by mouth As Needed. PRN, Disp: , Rfl:     levocetirizine (XYZAL) 5 MG tablet, Take 1 tablet by mouth Every Evening., Disp: , Rfl:     MAGNESIUM PO, Take  by mouth., Disp: , Rfl:     MULTIPLE VITAMIN PO, Take 1 tablet by mouth Daily., Disp: , Rfl:     Nutritional Supplements (FRUIT & VEGETABLE DAILY PO), Take  by mouth., Disp: , Rfl:     omeprazole  "(priLOSEC) 20 MG capsule, Take 1 capsule by mouth Daily., Disp: , Rfl:     tiZANidine (ZANAFLEX) 4 MG tablet, Take 1 tablet by mouth At Night As Needed for Muscle Spasms., Disp: , Rfl:     vitamin B-12 (CYANOCOBALAMIN) 1000 MCG tablet, Take 1 tablet by mouth Daily., Disp: , Rfl:     Allergies:   Allergies   Allergen Reactions    Prednisone Other (See Comments)     REPORTS CAUSED VISUAL DISTURBANCE (ACUTE BLINDNESS) AND HYPOGLYCEMIA.  REPORTS SHE IS ABLE TO TAKE STEROID INJECTIONS, BUT THAT SHE IS NOT ABLE TO TOLERATE ORAL DOSES FOR PROLONGED PERIODS OF TIME.  Other reaction(s): Blindness - both eyes  1oral prednisone only,, after multiple doses  REPORTS CAUSED VISUAL DISTURBANCE (ACUTE BLINDNESS) AND HYPOGLYCEMIA.  REPORTS SHE IS ABLE TO TAKE STEROID INJECTIONS, BUT THAT SHE IS NOT ABLE TO TOLERATE ORAL DOSES FOR PROLONGED PERIODS OF TIME.    Nsaids Unknown - High Severity    Codeine Unknown - Low Severity     Other reaction(s): Unknown - Low Severity       Review of Systems:   Review of Systems   Constitutional:  Positive for fatigue and unexpected weight loss. Negative for appetite change and fever.   HENT:  Negative for trouble swallowing.    Gastrointestinal:  Positive for abdominal pain, diarrhea and GERD. Negative for abdominal distention, anal bleeding, blood in stool, constipation, nausea, rectal pain, vomiting and indigestion.       The following portions of the patient's history were reviewed and updated as appropriate: allergies, current medications, past family history, past medical history, past social history, past surgical history and problem list.    Objective     Physical Exam:  Vital Signs:   Vitals:    11/27/23 1538   BP: 97/60   Pulse: 66   Temp: 98.2 °F (36.8 °C)   TempSrc: Infrared   Weight: 57.6 kg (127 lb)   Height: 160 cm (63\")  Comment: per patient       Physical Exam  Constitutional:       Appearance: Normal appearance.   HENT:      Head: Normocephalic and atraumatic.   Eyes:      " Conjunctiva/sclera: Conjunctivae normal.   Abdominal:      General: Abdomen is flat. There is no distension.      Palpations: There is no mass.      Tenderness: There is no abdominal tenderness. There is no guarding or rebound.      Hernia: No hernia is present.   Musculoskeletal:      Cervical back: Normal range of motion and neck supple.   Neurological:      Mental Status: She is alert.         Results Review:   I reviewed the patient's new clinical results.    No visits with results within 90 Day(s) from this visit.   Latest known visit with results is:   Admission on 2022, Discharged on 2022   Component Date Value Ref Range Status    Reference Lab Report 2022    Final                    Value:[facility]  [address] [place] [address]  Phone: [phone] or [phone]  Fax: [phone]  Gabriel Morley M.D., Medical Director    PATIENT NAME                           LABORATORY NO.  CRISTAL SWEENEY.                    [id number]  [id number]                         AGE              SEX  SSN           CLIENT REF #  [facility]            71      1950  F    [id number]   [id number]    [address]                REQUESTING M.D.     ATTENDING M.D.     COPY TO.  [address]                        [place] [address]                 SHREYA YOUNG  DATE COLLECTED      DATE RECEIVED      DATE REPORTED  2022          2022         06/15/2022    DIAGNOSIS:  A.   TERMINAL ILEUM BIOPSY:  Ileal-type mucosa with no significant pathologic abnormalities  Negative for acute inflammation, granulomas and dysplasia  B.   RANDOM COLON BIOPSIES, LEFT, RIGHT, AND                           TRANSVERSE:  Lymphocytic colitis (see comment)    COMMENT:  The histologic sections show colorectal-type mucosa with dramatically increased  intraepithelial lymphocytes and associated reactive  "epithelial changes including  mucin depletion. There is no significant thickening of the subepithelial collagen  table. Although there are scattered intraepithelial neutrophils, this is not the  predominant finding. Features of chronicity (including a basal  lymphoplasmacytosis) are not prominent.    Overall, these findings are compatible with lymphocytic colitis. However, an  expanding list of medications is implicated in lymphocytic-pattern colitis  (including NSAIDs, losartan, and ranitidine, among many others). Additionally,  some infections can cause a lymphocytic-pattern colitis. As such, clinical and  endoscopic correlation is recommended.    CLINICAL HISTORY:  Diarrhea, unspecified type    SPECIMENS RECEIVED:  A.  TERMINAL ILEUM BIOPSY  B.  RANDOM COLON BIOPSIES , LEFT, RIGHT, AND                           TRANSVERSE    MICROSCOPIC DESCRIPTION:  Tissue blocks are prepared and slides are examined microscopically on all  specimens. See diagnosis for details.    Professional interpretation rendered by Chayo Draper D.O.,[facility] at [facility], [address] [place] [address].    GROSS DESCRIPTION:  A.  Specimen is received in 1 formalin filled container labeled \"terminal ileum  biopsy\" and consists of a single portion of tan soft tissue measuring 0.3 x  0.2 x 0.2 cm.  The specimen is submitted entirely in 1 cassette.  B.    Specimen is received in 1 formalin filled container labeled \"colon  biopsies\" and consists of multiple portions of tan soft tissue measuring 0.6  x 0.4 x 0.1 cm.  The specimen is filtered and submitted entirely in 1  cassette.    REVIEWED, DIAGNOSED AND ELECTRONICALLY  SIGNED BY:    Chayo Draper D.O.,HIPOLITO.  CPT CODES:  88305x2        CT Angiogram Chest    Result Date: 10/31/2023  No pulmonary embolism or other abnormality. Images reviewed, interpreted and dictated by Dr. Vasile Marks MD     Colonoscopy on 6/14/2022  - Non-bleeding external and internal hemorrhoids.  - " The examined portion of the ileum was normal. Biopsied.  - Biopsies were taken with a cold forceps from the right colon, left colon and transverse colon for evaluation of microscopic colitis.    Pathology  TERMINAL ILEUM BIOPSY:  Ileal-type mucosa with no significant pathologic abnormalities  Negative for acute inflammation, granulomas and dysplasia  B. RANDOM COLON BIOPSIES, LEFT, RIGHT, AND TRANSVERSE:  Lymphocytic colitis (see comment)  Assessment / Plan      1.  Microscopic colitis (suspected NSAID/PPI induced)  2.  Suspected underlying irritable bowel with diarrhea  3.  Gastroesophageal reflux disease without esophagitis  11/27/2023  Patient is mostly doing well except occasional episodes of diarrhea with fecal urgency.  FODMAP diet did not help her.  No significant abdominal pain.  No nausea or vomiting.  Lab work done on 10/31/2023 reviewed and reveals a normal CBC with hemoglobin 14 WBC 9000 platelet [id number].  Her vitamin B12 was 943 Landa catheter 64.  Iron 63 iron saturation 20%.    Will increase her bismuth subsalicylate to 1 tablets p.o. 3 times daily and can be increased to 4 times daily if needed  Imodium 1 tablets p.o. as needed for any bowel movement more than 3/day, during travel or shopping  We will also consider budesonide intermittent therapy in the future if there is any significant flareups  Continue low-dose PPI as she had a significant reflux symptoms after stopping PPI.  Will follow her up in 6 months time with the lab work    2/14/2023  Patient has a longstanding history of loose stools, diffuse abdominal pain, nausea.  She had a gallbladder removed many years ago for the same symptoms.  Patient also has a fibromyalgia.  She is also smoking marijuana regularly. As per patient she had a trial of colestipol and cholestyramine which did not help. She stopped taking NSAIDs now.  She was prescribed budesonide 8-week course but patient states that she may not have taken that one.  She changed her  diet as well and cut down her marijuana use.  She states that mahana IBS CBT therapy helped her.  Colonoscopy done on 6/14/2022 did not reveal any endoscopic signs of colitis or ileitis.  Random colon biopsies consistent with lymphocytic colitis and TI biopsy was normal. Her celiac serology was negative.  Fecal calprotectin and fecal elastase was normal.  Recent stool studies including stool C. difficile and GI panel negative.     Prior history  4. Personal history of colon polyps  Colonoscopy done 6/14/2022 did not reveal any colon polyps.  Recommend screening colonoscopy in 2032 in 10 years time      Follow Up:   No follow-ups on file.    Mario Alberto Mittal MD  [facility]  11/27/2023  15:40 EST     Please note that portions of this note may have been completed with a voice recognition program.

## 2024-05-30 ENCOUNTER — OFFICE VISIT (OUTPATIENT)
Dept: GASTROENTEROLOGY | Facility: CLINIC | Age: 74
End: 2024-05-30
Payer: MEDICARE

## 2024-05-30 VITALS
WEIGHT: 128 LBS | BODY MASS INDEX: 22.68 KG/M2 | TEMPERATURE: 98.2 F | DIASTOLIC BLOOD PRESSURE: 69 MMHG | HEIGHT: 63 IN | HEART RATE: 65 BPM | SYSTOLIC BLOOD PRESSURE: 118 MMHG

## 2024-05-30 DIAGNOSIS — K58.0 IRRITABLE BOWEL SYNDROME WITH DIARRHEA: ICD-10-CM

## 2024-05-30 DIAGNOSIS — K52.832 LYMPHOCYTIC COLITIS: Primary | ICD-10-CM

## 2024-05-30 DIAGNOSIS — K21.9 GASTROESOPHAGEAL REFLUX DISEASE WITHOUT ESOPHAGITIS: ICD-10-CM

## 2024-05-30 PROCEDURE — 99213 OFFICE O/P EST LOW 20 MIN: CPT | Performed by: INTERNAL MEDICINE

## 2024-05-30 PROCEDURE — 1160F RVW MEDS BY RX/DR IN RCRD: CPT | Performed by: INTERNAL MEDICINE

## 2024-05-30 PROCEDURE — 1159F MED LIST DOCD IN RCRD: CPT | Performed by: INTERNAL MEDICINE

## 2024-05-30 RX ORDER — BUSPIRONE HYDROCHLORIDE 10 MG/1
10 TABLET ORAL 3 TIMES DAILY
COMMUNITY
Start: 2024-04-11

## 2024-05-30 RX ORDER — MEMANTINE HYDROCHLORIDE 5 MG/1
5 TABLET ORAL 2 TIMES DAILY
COMMUNITY
Start: 2024-01-23

## 2024-05-30 RX ORDER — ARIPIPRAZOLE 2 MG/1
2 TABLET ORAL DAILY
COMMUNITY
Start: 2024-05-17

## 2024-05-30 RX ORDER — ACYCLOVIR 50 MG/G
1 OINTMENT TOPICAL
COMMUNITY
Start: 2024-02-29

## 2024-05-30 RX ORDER — CELECOXIB 200 MG/1
200 CAPSULE ORAL DAILY PRN
COMMUNITY
Start: 2024-04-08

## 2024-05-30 NOTE — PROGRESS NOTES
"     Follow Up Note     Date: 2024   Patient Name: Klaudia Rios  MRN: [id number]  : 1950     Referring Physician: Trena Arias MD    Chief Complaint:    Chief Complaint   Patient presents with    Heartburn    Lymphocytic colitis       Interval History:   2024  Klaudia Rios is a 73 y.o. female who is here today for follow up for her lymphocytic colitis and bowel symptoms.  She states that she started taking fruit  and veg capsules 2 times daily since then her bowel symptoms improved significantly.  She is also watching her diet.  She is currently not taking bismuth subsalicylate.  She has been having anywhere 2-3 soft bowel movement daily.     2021  She has a history of diarrhea for many years with 1-5 loose to watery bowel movements per day. She has occasional incontinence of bowels and she is not aware it has happened. She has urgency associated with bowel movements. Eating makes symptoms worse.  No abdominal pain. No rectal bleeding. She has occasional vomiting during a bowel movement, but this is unchanged.  Her last colonoscopy was about 3 months ago at [facility], she was told everything was \"normal\". Results are not available.     She has a history of severe reflux. She is taking Omeprazole 20 mg daily with reasonable control of reflux. No difficulty swallowing.  There is no family history of GI malignancy or IBD.   Subjective      Past Medical History:   Past Medical History:   Diagnosis Date    Arthritis     REPORTS BOTHERSOME IN HER NECK    Back pain     foraminal narrowing    Body piercing     EARS    Cancer     SKIN (REMOVED FROM NOSE)    Elevated cholesterol     Fibromyalgia     Functional diarrhea     GERD (gastroesophageal reflux disease)     H/O bone density study     H/O exercise stress test     REPORTS APPROXIMATELY 2014 AND THAT ALL WAS WN'S    H/O mammogram 2016    Headache     Hearing loss     REPORTS MINIMAL AND ONLY WITH CERTAIN " FREQUENCIES. NO HEARING AIDS    History of bronchitis     History of TMJ syndrome     Hyperlipemia     IBS (irritable bowel syndrome)     Low back pain 9/2022    Pre-diabetes     Rectocele     Seasonal allergies     TB (tuberculosis)     REPORTS +TB SKIN TEST IN THE 80'S.  REPORTS SHE HAD TREATMENT FOR 1 YEAR BUT NO ACTIVE DISEASE.     Tennis elbow     Wears glasses     NON RX FOR READING     Past Surgical History:   Past Surgical History:   Procedure Laterality Date    ANTERIOR AND POSTERIOR VAGINAL REPAIR N/A 08/28/2018    Procedure: ANTERIOR AND POSTERIOR VAGINAL REPAIR, ENTEROCELE REPAIR;  Surgeon: Teressa Gomes MD;  Location: [facility] OR;  Service: Obstetrics/Gynecology    BILATERAL OOPHORECTOMY      BLADDER REPAIR  2001    CARPAL TUNNEL RELEASE  2019    CHOLECYSTECTOMY  2005    COLONOSCOPY  2011    COLONOSCOPY  05/25/2021    COLONOSCOPY N/A 06/14/2022    Procedure: COLONOSCOPY WITH BIOPSY;  Surgeon: Mario Alberto Mittal MD;  Location: [facility] ENDOSCOPY;  Service: Gastroenterology;  Laterality: N/A;    CYST REMOVAL      POSTERIOR UPPER LEFT THIGH AND LEFT SIDE OF NECK    CYSTOSCOPY N/A 08/28/2018    Procedure: CYSTOSCOPY;  Surgeon: Teressa Gomes MD;  Location: [facility] OR;  Service: Obstetrics/Gynecology    ELBOW ARTHROSCOPY Right 1996    ENDOSCOPY  5 years ago    EYE SURGERY Bilateral     CATARACT EXTRACTIONS    GALLBLADDER SURGERY  2006    HYSTERECTOMY  2002    KNEE SURGERY      REPORTS 3 SURGERIES LEFT KNEE, 1 SURGERY RIGHT KNEE    MOUTH SURGERY  04/2022    SKIN BIOPSY      REPORTS SKIN CANCER REMOVED FROM NOSE    TUBAL ABDOMINAL LIGATION      UPPER GASTROINTESTINAL ENDOSCOPY  2007    WISDOM TOOTH EXTRACTION      EXTRACTIONS ON THE LEFT SIDE (TOP AND BOTTOM)       Family History:   Family History   Problem Relation Age of Onset    Ovarian cancer Mother     Diabetes Mother     Heart disease Mother     Cancer Mother         ovarian    Vision loss Mother     Osteoporosis Sister     Early death Sister         Heart     Diabetes Brother     Heart disease Brother     Stroke Brother     Colon polyps Brother     Diabetes Brother     Diabetes Brother     Colon cancer Neg Hx     Rectal cancer Neg Hx     Stomach cancer Neg Hx     Liver cancer Neg Hx        Social History:   Social History     Socioeconomic History    Marital status:    Tobacco Use    Smoking status: Never    Smokeless tobacco: Never   Vaping Use    Vaping status: Never Used   Substance and Sexual Activity    Alcohol use: No    Drug use: Yes     Frequency: 7.0 times per week     Types: Marijuana     Comment: for fms    Sexual activity: Defer     Birth control/protection: Hysterectomy       Medications:     Current Outpatient Medications:     acyclovir (ZOVIRAX) 5 % ointment, Apply 1 Application topically to the appropriate area as directed., Disp: , Rfl:     ARIPiprazole (ABILIFY) 2 MG tablet, Take 1 tablet by mouth Daily., Disp: , Rfl:     Ascorbic Acid (VITAMIN C GUMMIE PO), Take 1 tablet by mouth Daily., Disp: , Rfl:     atorvastatin (LIPITOR) 80 MG tablet, Take 1 tablet by mouth Daily., Disp: , Rfl:     busPIRone (BUSPAR) 10 MG tablet, Take 1 tablet by mouth 3 (Three) Times a Day., Disp: , Rfl:     celecoxib (CeleBREX) 200 MG capsule, Take 1 capsule by mouth Daily As Needed., Disp: , Rfl:     cholecalciferol (VITAMIN D3) 1000 units tablet, Take 1 tablet by mouth Daily., Disp: , Rfl:     dicyclomine (BENTYL) 20 MG tablet, Take 1 tablet by mouth 2 (Two) Times a Day As Needed (Abdominal pain)., Disp: 60 tablet, Rfl: 2    donepezil ODT (ARICEPT ODT) 5 MG disintegrating tablet, Take 1 tablet by mouth Daily., Disp: 30 tablet, Rfl: 11    DULoxetine (CYMBALTA) 60 MG capsule, Take 1 capsule by mouth Daily., Disp: 30 capsule, Rfl: 2    estradiol (ESTRACE VAGINAL) 0.1 MG/GM vaginal cream, Use 0.5 grams intravaginally twice weekly to control symptoms., Disp: 1 each, Rfl: 11    gabapentin (NEURONTIN) 600 MG tablet, Take 1 tablet by mouth As Needed. PRN, Disp: , Rfl:      levocetirizine (XYZAL) 5 MG tablet, Take 1 tablet by mouth Every Evening., Disp: , Rfl:     memantine (NAMENDA) 5 MG tablet, Take 1 tablet by mouth 2 (Two) Times a Day., Disp: , Rfl:     MULTIPLE VITAMIN PO, Take 1 tablet by mouth Daily., Disp: , Rfl:     Nutritional Supplements (FRUIT & VEGETABLE DAILY PO), Take 2 capsules by mouth Daily., Disp: , Rfl:     omeprazole (priLOSEC) 20 MG capsule, Take 1 capsule by mouth Daily., Disp: , Rfl:     tiZANidine (ZANAFLEX) 4 MG tablet, Take 1 tablet by mouth At Night As Needed for Muscle Spasms., Disp: , Rfl:     Allergies:   Allergies   Allergen Reactions    Prednisone Other (See Comments)     REPORTS CAUSED VISUAL DISTURBANCE (ACUTE BLINDNESS) AND HYPOGLYCEMIA.  REPORTS SHE IS ABLE TO TAKE STEROID INJECTIONS, BUT THAT SHE IS NOT ABLE TO TOLERATE ORAL DOSES FOR PROLONGED PERIODS OF TIME.  Other reaction(s): Blindness - both eyes  1oral prednisone only,, after multiple doses  REPORTS CAUSED VISUAL DISTURBANCE (ACUTE BLINDNESS) AND HYPOGLYCEMIA.  REPORTS SHE IS ABLE TO TAKE STEROID INJECTIONS, BUT THAT SHE IS NOT ABLE TO TOLERATE ORAL DOSES FOR PROLONGED PERIODS OF TIME.    Nsaids GI Intolerance     Most nsaids  Patient has colitis        Codeine Unknown - Low Severity     Other reaction(s): Unknown - Low Severity       Review of Systems:   Review of Systems   Constitutional:  Positive for appetite change (come and goes) and fatigue. Negative for fever and unexpected weight loss.   HENT:  Negative for trouble swallowing.    Gastrointestinal:  Positive for diarrhea. Negative for abdominal distention, abdominal pain, anal bleeding, blood in stool, constipation, nausea, rectal pain, vomiting, GERD and indigestion.       The following portions of the patient's history were reviewed and updated as appropriate: allergies, current medications, past family history, past medical history, past social history, past surgical history and problem list.    Objective     Physical Exam:  Vital  "Signs:   Vitals:    24 1449   BP: 118/69   Pulse: 65   Temp: 98.2 °F (36.8 °C)   TempSrc: Infrared   Weight: 58.1 kg (128 lb)  Comment: with clothing/shoes   Height: 160 cm (63\")  Comment: per EPIC       Physical Exam  Constitutional:       Appearance: Normal appearance.   HENT:      Head: Normocephalic and atraumatic.   Eyes:      Conjunctiva/sclera: Conjunctivae normal.   Abdominal:      General: Abdomen is flat. There is no distension.      Palpations: There is no mass.      Tenderness: There is no abdominal tenderness. There is no guarding or rebound.      Hernia: No hernia is present.   Musculoskeletal:      Cervical back: Normal range of motion and neck supple.   Neurological:      Mental Status: She is alert.         Results Review:   I reviewed the patient's new clinical results.    No visits with results within 90 Day(s) from this visit.   Latest known visit with results is:   Admission on 2022, Discharged on 2022   Component Date Value Ref Range Status    Reference Lab Report 2022    Final                    Value:[facility]  [address] [place] [address]  Phone: [phone] or [phone]  Fax: [phone]  Gabriel Morley M.D., Medical Director    PATIENT NAME                           LABORATORY NO.  CRISTAL SWEENEY.                    [id number]  [id number]                         AGE              SEX  SSN           CLIENT REF #  [facility]            71      1950  F    [id number]   [id number]    [address]                REQUESTING M.D.     ATTENDING M.D.     COPY TO.  [address]                        [place] [address]                 SHREYA YOUNG  DATE COLLECTED      DATE RECEIVED      DATE REPORTED  2022          2022         06/15/2022    DIAGNOSIS:  A.   TERMINAL ILEUM BIOPSY:  Ileal-type mucosa with no significant pathologic " "abnormalities  Negative for acute inflammation, granulomas and dysplasia  B.   RANDOM COLON BIOPSIES, LEFT, RIGHT, AND                           TRANSVERSE:  Lymphocytic colitis (see comment)    COMMENT:  The histologic sections show colorectal-type mucosa with dramatically increased  intraepithelial lymphocytes and associated reactive epithelial changes including  mucin depletion. There is no significant thickening of the subepithelial collagen  table. Although there are scattered intraepithelial neutrophils, this is not the  predominant finding. Features of chronicity (including a basal  lymphoplasmacytosis) are not prominent.    Overall, these findings are compatible with lymphocytic colitis. However, an  expanding list of medications is implicated in lymphocytic-pattern colitis  (including NSAIDs, losartan, and ranitidine, among many others). Additionally,  some infections can cause a lymphocytic-pattern colitis. As such, clinical and  endoscopic correlation is recommended.    CLINICAL HISTORY:  Diarrhea, unspecified type    SPECIMENS RECEIVED:  A.  TERMINAL ILEUM BIOPSY  B.  RANDOM COLON BIOPSIES , LEFT, RIGHT, AND                           TRANSVERSE    MICROSCOPIC DESCRIPTION:  Tissue blocks are prepared and slides are examined microscopically on all  specimens. See diagnosis for details.    Professional interpretation rendered by Chayo Draper D.O.,F.C.A.P. at [facility] [email or web address], [address] [place] [address].    GROSS DESCRIPTION:  A.  Specimen is received in 1 formalin filled container labeled \"terminal ileum  biopsy\" and consists of a single portion of tan soft tissue measuring 0.3 x  0.2 x 0.2 cm.  The specimen is submitted entirely in 1 cassette.  B.    Specimen is received in 1 formalin filled container labeled \"colon  biopsies\" and consists of multiple portions of tan soft tissue measuring 0.6  x 0.4 x 0.1 cm.  The specimen is filtered and submitted entirely in 1  cassette.    REVIEWED, " DIAGNOSED AND ELECTRONICALLY  SIGNED BY:    Chayo Draper D.O.,FSauloCSauloARICHARD.  CPT CODES:  88305x2        No radiology results for the last 90 days.      CT Angiogram Chest     Result Date: 10/31/2023  No pulmonary embolism or other abnormality. Images reviewed, interpreted and dictated by Dr. Vasile Marks MD      Colonoscopy on 6/14/2022  - Non-bleeding external and internal hemorrhoids.  - The examined portion of the ileum was normal. Biopsied.  - Biopsies were taken with a cold forceps from the right colon, left colon and transverse colon for evaluation of microscopic colitis.     Pathology  TERMINAL ILEUM BIOPSY:  Ileal-type mucosa with no significant pathologic abnormalities  Negative for acute inflammation, granulomas and dysplasia  B. RANDOM COLON BIOPSIES, LEFT, RIGHT, AND TRANSVERSE:  Lymphocytic colitis (see comment)    Assessment / Plan      1.  Microscopic colitis  2.  Suspected underlying irritable bowel with diarrhea  3.  Gastroesophageal reflux disease without esophagitis  5/30/2024  As per patient after she started taking fruit and vegetable capsules 2 times daily her symptoms significantly improved.  She is not taking any Imodium or bismuth subsalicylate.  She is also watching on the diet.  Last lab work done in January 2024 revealed normal CBC with a hemoglobin of 14 g/dL and normal CMP.    As patient is clinically doing well with fruit 10 vegetable capsules advised to continue dose capsules p.o. twice daily  Can take bismuth subsalicylate if there is any worsening loose stools  We will also consider intermittent therapy with budesonide if any symptoms worsens  She may need small bowel PillCam study in the future to rule out Crohn's if symptoms worsens  Patient is supposed to get lab work done at PCPs office soon.  Will follow her up in 1 year time    2/14/2023  Patient has a longstanding history of loose stools, diffuse abdominal pain, nausea.  She had a gallbladder removed many years ago for the  same symptoms.  Patient also has a fibromyalgia.  She is also smoking marijuana regularly. As per patient she had a trial of colestipol and cholestyramine which did not help. She stopped taking NSAIDs now.  She was prescribed budesonide 8-week course but patient states that she may not have taken that one.  She changed her diet as well and cut down her marijuana use.  She states that [facility] IBS CBT therapy helped her. Fecal charles 17 and  elastase 462. Colonoscopy done on 6/14/2022 did not reveal any endoscopic signs of colitis or ileitis.  Random colon biopsies consistent with lymphocytic colitis and TI biopsy was normal. Her celiac serology was negative.  Fecal calprotectin and fecal elastase was normal.  Recent stool studies including stool C. difficile and GI panel negative.     Prior history  4. Personal history of colon polyps  Colonoscopy done 6/14/2022 did not reveal any colon polyps.  Recommend screening colonoscopy in 2032 in 10 years time      Follow Up:   No follow-ups on file.    Mario Alberto Mittal MD  [facility] [place]  5/30/2024  14:51 EDT     Please note that portions of this note may have been completed with a voice recognition program.

## 2025-03-25 RX ORDER — ESTRADIOL 0.1 MG/G
CREAM VAGINAL
Qty: 42.5 G | Refills: 11 | OUTPATIENT
Start: 2025-03-25

## 2025-03-28 ENCOUNTER — TELEPHONE (OUTPATIENT)
Dept: OBSTETRICS AND GYNECOLOGY | Facility: CLINIC | Age: 75
End: 2025-03-28
Payer: MEDICARE

## 2025-03-28 NOTE — TELEPHONE ENCOUNTER
Caller: Klaudia Rios    Relationship: Self    Best call back number: [phone]    Requested Prescriptions: ESTRADIOL CREAM  Requested Prescriptions      No prescriptions requested or ordered in this encounter        Pharmacy where request should be sent:[facility]       Last office visit with prescribing clinician: 5/4/2023   Last telemedicine visit with prescribing clinician: Visit date not found   Next office visit with prescribing clinician: 5/21/2025     Additional details provided by patient:     Does the patient have less than a 3 day supply:  [x] Yes  [] No    Would you like a call back once the refill request has been completed: [x] Yes [] No    If the office needs to give you a call back, can they leave a voicemail: [x] Yes [] No    Ambrocio Lester Rep   03/28/25 10:32 EDT

## 2025-03-31 RX ORDER — ESTRADIOL 0.1 MG/G
CREAM VAGINAL
Qty: 1 EACH | Refills: 11 | Status: SHIPPED | OUTPATIENT
Start: 2025-03-31

## 2025-05-21 ENCOUNTER — OFFICE VISIT (OUTPATIENT)
Dept: OBSTETRICS AND GYNECOLOGY | Facility: CLINIC | Age: 75
End: 2025-05-21
Payer: MEDICARE

## 2025-05-21 VITALS
HEIGHT: 64 IN | BODY MASS INDEX: 22.71 KG/M2 | SYSTOLIC BLOOD PRESSURE: 122 MMHG | DIASTOLIC BLOOD PRESSURE: 60 MMHG | WEIGHT: 133 LBS

## 2025-05-21 DIAGNOSIS — Z01.411 ENCOUNTER FOR GYNECOLOGICAL EXAMINATION (GENERAL) (ROUTINE) WITH ABNORMAL FINDINGS: Primary | ICD-10-CM

## 2025-05-21 DIAGNOSIS — Z12.31 ENCOUNTER FOR SCREENING MAMMOGRAM FOR BREAST CANCER: ICD-10-CM

## 2025-05-21 DIAGNOSIS — N94.10 DYSPAREUNIA IN FEMALE: ICD-10-CM

## 2025-05-21 DIAGNOSIS — N95.2 POSTMENOPAUSAL ATROPHIC VAGINITIS: ICD-10-CM

## 2025-05-21 DIAGNOSIS — R41.3 MEMORY DEFICIT: ICD-10-CM

## 2025-05-21 RX ORDER — ESTRADIOL 0.1 MG/G
CREAM VAGINAL
Qty: 1 EACH | Refills: 11 | Status: SHIPPED | OUTPATIENT
Start: 2025-05-21

## 2025-05-21 NOTE — PROGRESS NOTES
Chief Complaint  Gynecologic Exam     History of Present Illness:  Patient is 74 y.o.  who presents to [facility] for her annual examination.  Patient is a poor historian.  She reports having her mammogram last year.  She sees Dr. Arias for her primary care.  She does report having substantial changes in her memory.  She is being evaluated for Alzheimer's disease at .  She did have recent PET scan.  She has a follow-up in September.  She had a colonoscopy in .  She has continued using her estrogen cream.  She reports she has been using it twice weekly.  She has continued to have dyspareunia and vaginal dryness.  She did have recent bone density scan.    History  Past Medical History:   Diagnosis Date    Anxiety years ago    Arthritis     REPORTS BOTHERSOME IN HER NECK    Back pain     foraminal narrowing    Body piercing     EARS    Cancer     SKIN (REMOVED FROM NOSE)    Chronic pain disorder fms years ago    Depression years ago    Elevated cholesterol     Fibromyalgia     Functional diarrhea     GERD (gastroesophageal reflux disease)     H/O bone density study     H/O exercise stress test     REPORTS APPROXIMATELY 2014 AND THAT ALL WAS WN'S    H/O mammogram 2016    Headache     Hearing loss     REPORTS MINIMAL AND ONLY WITH CERTAIN FREQUENCIES. NO HEARING AIDS    History of bronchitis     History of TMJ syndrome     Hyperlipemia     IBS (irritable bowel syndrome)     Low back pain 2022    Pre-diabetes     Rectocele     Seasonal allergies     TB (tuberculosis)     REPORTS +TB SKIN TEST IN THE .  REPORTS SHE HAD TREATMENT FOR 1 YEAR BUT NO ACTIVE DISEASE.     Tennis elbow     Wears glasses     NON RX FOR READING     Current Outpatient Medications on File Prior to Visit   Medication Sig Dispense Refill    acyclovir (ZOVIRAX) 5 % ointment Apply 1 Application topically to the appropriate area as directed.      ARIPiprazole (ABILIFY) 2 MG tablet Take 1 tablet by  mouth Daily.      Ascorbic Acid (VITAMIN C GUMMIE PO) Take 1 tablet by mouth Daily.      atorvastatin (LIPITOR) 80 MG tablet Take 1 tablet by mouth Daily.      busPIRone (BUSPAR) 10 MG tablet Take 1 tablet by mouth 3 (Three) Times a Day.      celecoxib (CeleBREX) 200 MG capsule Take 1 capsule by mouth Daily As Needed.      cholecalciferol (VITAMIN D3) 1000 units tablet Take 1 tablet by mouth Daily.      dicyclomine (BENTYL) 20 MG tablet Take 1 tablet by mouth 2 (Two) Times a Day As Needed (Abdominal pain). 60 tablet 2    donepezil ODT (ARICEPT ODT) 5 MG disintegrating tablet Take 1 tablet by mouth Daily. 30 tablet 11    DULoxetine (CYMBALTA) 60 MG capsule Take 1 capsule by mouth Daily. 30 capsule 2    gabapentin (NEURONTIN) 600 MG tablet Take 1 tablet by mouth As Needed. PRN      levocetirizine (XYZAL) 5 MG tablet Take 1 tablet by mouth Every Evening.      memantine (NAMENDA) 5 MG tablet Take 1 tablet by mouth 2 (Two) Times a Day.      MULTIPLE VITAMIN PO Take 1 tablet by mouth Daily.      Nutritional Supplements (FRUIT & VEGETABLE DAILY PO) Take 2 capsules by mouth Daily.      omeprazole (priLOSEC) 20 MG capsule Take 1 capsule by mouth Daily.      tiZANidine (ZANAFLEX) 4 MG tablet Take 1 tablet by mouth At Night As Needed for Muscle Spasms.      [DISCONTINUED] estradiol (ESTRACE VAGINAL) 0.1 MG/GM vaginal cream Use 0.5 grams intravaginally twice weekly to control symptoms. 1 each 11     No current facility-administered medications on file prior to visit.     Allergies   Allergen Reactions    Prednisone Other (See Comments)     REPORTS CAUSED VISUAL DISTURBANCE (ACUTE BLINDNESS) AND HYPOGLYCEMIA.  REPORTS SHE IS ABLE TO TAKE STEROID INJECTIONS, BUT THAT SHE IS NOT ABLE TO TOLERATE ORAL DOSES FOR PROLONGED PERIODS OF TIME.  Other reaction(s): Blindness - both eyes  1oral prednisone only,, after multiple doses  REPORTS CAUSED VISUAL DISTURBANCE (ACUTE BLINDNESS) AND HYPOGLYCEMIA.  REPORTS SHE IS ABLE TO TAKE STEROID  INJECTIONS, BUT THAT SHE IS NOT ABLE TO TOLERATE ORAL DOSES FOR PROLONGED PERIODS OF TIME.    Nsaids GI Intolerance     Most nsaids  Patient has colitis        Codeine Unknown - Low Severity     Other reaction(s): Unknown - Low Severity     Past Surgical History:   Procedure Laterality Date    ANTERIOR AND POSTERIOR VAGINAL REPAIR N/A 08/28/2018    Procedure: ANTERIOR AND POSTERIOR VAGINAL REPAIR, ENTEROCELE REPAIR;  Surgeon: Teressa Gomes MD;  Location: [facility] OR;  Service: Obstetrics/Gynecology    BILATERAL OOPHORECTOMY      BLADDER REPAIR  2001    CARPAL TUNNEL RELEASE  2019    CHOLECYSTECTOMY  2005    COLONOSCOPY  2011    COLONOSCOPY  05/25/2021    COLONOSCOPY N/A 06/14/2022    Procedure: COLONOSCOPY WITH BIOPSY;  Surgeon: Mario Alberto Mittal MD;  Location: [facility] ENDOSCOPY;  Service: Gastroenterology;  Laterality: N/A;    CYST REMOVAL      POSTERIOR UPPER LEFT THIGH AND LEFT SIDE OF NECK    CYSTOSCOPY N/A 08/28/2018    Procedure: CYSTOSCOPY;  Surgeon: Teressa Gomes MD;  Location: [facility] OR;  Service: Obstetrics/Gynecology    ELBOW ARTHROSCOPY Right 1996    ENDOSCOPY  5 years ago    EYE SURGERY Bilateral     CATARACT EXTRACTIONS    FRACTURE SURGERY  2020    GALLBLADDER SURGERY  2006    HYSTERECTOMY  2002    JOINT REPLACEMENT  6/10/2022    KNEE SURGERY      REPORTS 3 SURGERIES LEFT KNEE, 1 SURGERY RIGHT KNEE    MOUTH SURGERY  04/2022    SKIN BIOPSY      REPORTS SKIN CANCER REMOVED FROM NOSE    TUBAL ABDOMINAL LIGATION      UPPER GASTROINTESTINAL ENDOSCOPY  2007    WISDOM TOOTH EXTRACTION      EXTRACTIONS ON THE LEFT SIDE (TOP AND BOTTOM)     Family History   Problem Relation Age of Onset    Ovarian cancer Mother     Diabetes Mother     Heart disease Mother     Cancer Mother         ovarian    Vision loss Mother     Osteoporosis Sister     Early death Sister         Heart    Diabetes Brother     Heart disease Brother     Stroke Brother     Colon polyps Brother     Diabetes Brother     Diabetes Brother     Colon  "cancer Neg Hx     Rectal cancer Neg Hx     Stomach cancer Neg Hx     Liver cancer Neg Hx      Social History     Socioeconomic History    Marital status:    Tobacco Use    Smoking status: Never    Smokeless tobacco: Never   Vaping Use    Vaping status: Never Used   Substance and Sexual Activity    Alcohol use: No    Drug use: Yes     Frequency: 7.0 times per week     Types: Marijuana     Comment: for fms    Sexual activity: Defer     Birth control/protection: Hysterectomy       Physical Examination:  Vital Signs: /60   Ht 162.6 cm (64\")   Wt 60.3 kg (133 lb)   BMI 22.83 kg/m²     General Appearance: alert, appears stated age, and cooperative  Breasts: Examined in supine position  Symmetric without masses or skin dimpling  Nipples normal without inversion, lesions or discharge  There are no palpable axillary nodes  Abdomen: no masses, no hepatomegaly, no splenomegaly, soft non-tender, no guarding, and no rebound tenderness  Pelvic: Clinical staff was present for exam; pelvic examination was required for evaluation  External genitalia:  normal appearance of the external genitalia including Bartholin's and Samburg's glands.  :  urethral meatus normal;  Vaginal:  atrophic mucosal changes are present;  Cervix:  absent.  Uterus:  absent.  Adnexa:  non palpable bilaterally.  Pap smear done and specimen sent using Thin-Prep technique    Data Review:  The following data was reviewed by: Teressa Gomes MD on 05/21/2025:     Labs:    Imaging:  Mammo bilateral (06/24/2016 09:59)   DXA bone density spine and hip (03/17/2025 13:00)  PET/CT Amyloid Brain (05/07/2025 16:20)  Medical Records:  Rita Pillai MD  Physician  Specialty: Neurology     Progress Notes     Addendum     Encounter Date: 3/24/2025  Note Received: 05/21/25 1345           SUBJECTIVE:      Patient ID: Klaudia Rios is a 74 y.o. female, who  presents on 3/24/2025 for follow-up.        History of Present Illness   Consult: 3/14/24: 73 year old " "female with memory problems for few years. Mainly short term issues. Old memories are good.   Have associate degree and retired since 2008.   Born and raised in [place].   2 grown children and  for 30 years.      March 24, 2025; patient came for follow-up around memory issues.  She has missed her last appointment most likely.  Patient has been through neuropsych testing which revealed no evidence of new degenerative condition in November 2024.  She is still active and independent Not driving just out of precautions.   NO family dementia.   We have discussed about ATN profile for Alzheimer's detection and she is willing to proceed with the test.           Review of Systems   Respiratory:  Positive for cough.    Gastrointestinal:  Positive for diarrhea.   Musculoskeletal:  Positive for arthralgias, back pain, myalgias and neck pain.   All other systems reviewed and are negative.         OBJECTIVE:      /75 (BP Location: Right arm, Patient Position: Sitting, Cuff Size: Adult)   Pulse 56   Ht 1.6 m (5' 3\")   Wt 61.2 kg (135 lb)   BMI 23.91 kg/m²      Physical Exam  Vitals and nursing note reviewed. Exam conducted with a chaperone present.   Constitutional:       Appearance: Normal appearance.   HENT:      Head: Normocephalic and atraumatic.   Eyes:      General: Lids are normal.      Extraocular Movements: Extraocular movements intact.      Pupils: Pupils are equal, round, and reactive to light.   Cardiovascular:      Rate and Rhythm: Normal rate and regular rhythm.      Pulses: Normal pulses.      Heart sounds: Normal heart sounds.   Pulmonary:      Effort: Pulmonary effort is normal.      Breath sounds: Normal breath sounds.   Musculoskeletal:      Cervical back: Normal range of motion and neck supple.   Neurological:      Mental Status: She is alert.      Cranial Nerves: Cranial nerves 2-12 are intact.      Motor: Motor strength is normal.     Coordination: Romberg sign negative.      Gait: Gait is " intact.      Deep Tendon Reflexes:      Reflex Scores:       Tricep reflexes are 2+ on the right side and 2+ on the left side.       Bicep reflexes are 2+ on the right side and 2+ on the left side.       Brachioradialis reflexes are 2+ on the right side and 2+ on the left side.       Patellar reflexes are 2+ on the right side and 2+ on the left side.       Achilles reflexes are 2+ on the right side and 2+ on the left side.  Psychiatric:         Speech: Speech normal.         Neurological Exam  Mental Status  Alert. Speech is normal.     Cranial Nerves  CN II: Visual acuity is normal. Visual fields full to confrontation.  CN III, IV, VI: Extraocular movements intact bilaterally. Normal lids and orbits bilaterally. Pupils equal round and reactive to light bilaterally.  CN V: Facial sensation is normal.  CN VII: Full and symmetric facial movement.  CN VIII: Hearing is normal.  CN IX, X: Palate elevates symmetrically. Normal gag reflex.  CN XI: Shoulder shrug strength is normal.  CN XII: Tongue midline without atrophy or fasciculations.     Motor  Normal muscle bulk throughout. No fasciculations present. Normal muscle tone. No abnormal involuntary movements. Strength is 5/5 throughout all four extremities.     Sensory  Light touch is normal in upper and lower extremities. Pinprick is normal in upper and lower extremities. Vibration is normal in upper and lower extremities. Proprioception is normal in upper and lower extremities.      Reflexes                                            Right                      Left  Brachioradialis                    2+                         2+  Biceps                                 2+                         2+  Triceps                                2+                         2+  Patellar                                2+                         2+  Achilles                                2+                         2+  Right Plantar: downgoing  Left Plantar: downgoing      Coordination  Right: Finger-to-nose normal. Rapid alternating movement normal. Heel-to-shin normal.Left: Finger-to-nose normal. Rapid alternating movement normal. Heel-to-shin normal.     Gait   Normal gait. Romberg is absent.           [unfilled]  DXA bone density spine and hip  Narrative: BONE MINERAL DENSITOMETRY, DEXA SCAN    3/17/2025 1:00 PM .     CLINICAL HISTORY: Screening for osteoporosis.     COMPARISON: None.     FINDINGS: Bone densitometry calculations of the lumbar spine and left  femoral neck were obtained.     Average BMD for the lumbar spine from L1-L4 is 0.979 g/sq cm.  T-score is -1.7.  Z-score is 0.1.     Left femoral neck BMD is 0.775 g/sq cm.  T-score is -1.7.  Z score is 0.1.  Impression: 1. Osteopenia BMD of the lumbar spine.   2. Osteopenia BMD of the left femoral neck.      Images reviewed, interpreted, and dictated by Dr. Ray Santana.  Transcribed by Markie Little PA-C        ASSESSMENT & PLAN    Diagnoses and all orders for this visit:  Memory problem  -     ATN Profile; Future      Ms. Rudolph came to the clinic after a year of her last visit.  She had neuropsychological testing done which did not reveal any evidence of neurodegenerative condition.  Opiate of time she still thinks her memory is declining.  I have offered her the possibility of checking with ATN for Alzheimer's and she agreed to proceed with it.     If ATN + for AD will do full dementia work up.     Return in about 6 months (around 9/24/2025), or if ATN came positive will bring her early..        Electronically signed by Rita Pillai MD on 3/24/2025 at 10:32 AM.      Electronically signed by Rita Pillai MD at 03/24/25 1052  Electronically signed by Rita Pillai MD at 03/24/25 1055      Office Visit on 3/24/2025 Note shared with patient in the original system    Received From: [facility]       Assessment and Plan   1. Encounter for gynecological examination (general) (routine) with abnormal findings  I  explained to Klaudia that Pap smears are no longer recommended in patients after 65 years of age who have had adequate negative screening with three consecutive negative pap smears within the previous 10 years and the most recent test performed within the past 5 years. Women who have a history of MELISSA 2, MELISSA 3, or ACIS should continue routine screening for a total of 20 years after regression or treatment.   I stressed to Klaudia that she still should be seen yearly for a full physical including breast and pelvic exam.  I informed her that women aged 65 and older still do get cervical cancer representing 14.1% of the [place] female population and 19.6% of new cases of cervical cancer.  I also informed the patient regarding medicare guidelines on coverage for pap smear screening.  For this reason, Klaudia has elected pap smear screening this year.   - LIQUID-BASED PAP SMEAR WITH HPV GENOTYPING IF ASCUS ([facility])    2. Encounter for screening mammogram for breast cancer  It is recommended per ACOG, for women at average risk to start annual mammogram screening at the age of 40 until the age of 75 and an individualized decision be made for women after age 75.  She was encouraged to continue getting yearly mammograms.  She should report any palpable breast lump(s) or skin changes regardless of mammographic findings.  I explained to Klaudia that notification regarding her mammogram results will come from the center performing the study.  Our office will not be routinely calling with mammogram results.  It is her responsibility to make sure that the results from the mammogram are communicated to her by the breast center.  If she has any questions about the results, she is welcome to call our office anytime.  The patient reports she has done her mammogram and results are noted.    Klaudia was counseled regarding having clinical breast exams and breast self-awareness.  Women aged 29-39 years of age should have clinical breast exams every 1-3  years and yearly aged 40 and older.  The patient was counseled regarding breast self-awareness focusing on having a sense of what is normal for her breasts so that she can tell if there are changes.  Even small changes should be reported to provider.   - Mammo Screening Digital Tomosynthesis Bilateral With CAD; Future    3. Postmenopausal atrophic vaginitis  Discussed various options for relief of atrophic vaginal symptoms related to menopause. Discussed local therapy for treatment of vaginal symptoms only.  Discussed the different formulation options including cream, ring, and tablets.  Discussed the low risk of systemic absorption in postmenopausal women with atrophy using 25 mcgs of estradiol on a daily basis.  Recommend low dose use 2-3x/wk for maintenance of treatment.  Other treatment options were discussed including the use of water-based and silicone-based vaginal lubricants and moisturizers.  Also discussed was the FDA approved treatment option of ospemifene for moderate to severe dyspareunia.   - estradiol (ESTRACE VAGINAL) 0.1 MG/GM vaginal cream; Use 0.5 grams intravaginally twice weekly to control symptoms.  Dispense: 1 each; Refill: 11    4. Dyspareunia in female  Patient instructed use of water-based or silicone based lubricants.  Prescription is given for estrogen cream.  Instructions and precautions have been given.  - estradiol (ESTRACE VAGINAL) 0.1 MG/GM vaginal cream; Use 0.5 grams intravaginally twice weekly to control symptoms.  Dispense: 1 each; Refill: 11    5. Memory deficit  Patient with memory deficit as noted.  She has been diagnosed with Alzheimer's disease.  She did have scan as noted.  She does have a follow-up with neurology but not until November.  I reviewed with the patient the findings from her scan as well as neurology report.  Recommend patient call their office for further evaluation and treatment.    Follow Up/Instructions:  Follow up as noted.  Patient was given instructions  and counseling regarding her condition or for health maintenance advice. Please see specific information pulled into the AVS if appropriate.     Note: Speech recognition transcription software may have been used to dictate portions of this document.  An attempt at proofreading has been made though minor errors in transcription may still be present.    This note was electronically signed.  Teressa Gomes M.D.

## 2025-05-29 LAB — REF LAB TEST METHOD: NORMAL

## (undated) DEVICE — Device

## (undated) DEVICE — BLD CLIP UNIV SURG GRY

## (undated) DEVICE — PAD SANI MAXI W/ADHS SNG WRP 11IN

## (undated) DEVICE — CUFF SCD HEMOFORCE SEQ CALF STD MD

## (undated) DEVICE — HYBRID TUBING/CAP SET FOR OLYMPUS® SCOPES: Brand: ERBE

## (undated) DEVICE — VAGINAL PACKING: Brand: DEROYAL

## (undated) DEVICE — ST IRR CYSTO W/SPK 77IN LF

## (undated) DEVICE — FRCP BX RADJAW4 NDL 2.8 240 STD OG

## (undated) DEVICE — LUBE JELLY PK/2.75GM STRL BX/144

## (undated) DEVICE — GLV SURG BIOGEL M LTX PF 6 1/2

## (undated) DEVICE — ENDOSCOPY PORT CONNECTOR FOR OLYMPUS® SCOPES: Brand: ERBE

## (undated) DEVICE — SUT GUT CHRM 3/0 SH 27IN G122H

## (undated) DEVICE — TOTAL TRAY, 16FR 10ML SIL FOLEY, URN: Brand: MEDLINE

## (undated) DEVICE — SUT VIC 2/0 SH 27IN

## (undated) DEVICE — FLEXIBLE YANKAUER,MEDIUM TIP, NO VACUUM CONTROL: Brand: ARGYLE

## (undated) DEVICE — RICH LAVH: Brand: MEDLINE INDUSTRIES, INC.

## (undated) DEVICE — SUT GUT CHRM 0 802H

## (undated) DEVICE — DRAINBAG,ANTI-REFLUX TOWER,L/F,2000ML,LL: Brand: MEDLINE

## (undated) DEVICE — VLV SXN AIR/H2O ORCAPOD3 1P/U STRL